# Patient Record
Sex: MALE | Race: WHITE | NOT HISPANIC OR LATINO | Employment: FULL TIME | URBAN - METROPOLITAN AREA
[De-identification: names, ages, dates, MRNs, and addresses within clinical notes are randomized per-mention and may not be internally consistent; named-entity substitution may affect disease eponyms.]

---

## 2020-01-10 ENCOUNTER — APPOINTMENT (EMERGENCY)
Dept: RADIOLOGY | Facility: HOSPITAL | Age: 40
End: 2020-01-10
Payer: COMMERCIAL

## 2020-01-10 ENCOUNTER — HOSPITAL ENCOUNTER (EMERGENCY)
Facility: HOSPITAL | Age: 40
Discharge: HOME | End: 2020-01-11
Attending: EMERGENCY MEDICINE
Payer: COMMERCIAL

## 2020-01-10 VITALS
RESPIRATION RATE: 16 BRPM | HEART RATE: 91 BPM | DIASTOLIC BLOOD PRESSURE: 72 MMHG | BODY MASS INDEX: 24.82 KG/M2 | SYSTOLIC BLOOD PRESSURE: 130 MMHG | WEIGHT: 173 LBS | OXYGEN SATURATION: 95 % | TEMPERATURE: 99 F

## 2020-01-10 DIAGNOSIS — S43.014A ANTERIOR DISLOCATION OF RIGHT SHOULDER, INITIAL ENCOUNTER: Primary | ICD-10-CM

## 2020-01-10 PROCEDURE — 96374 THER/PROPH/DIAG INJ IV PUSH: CPT

## 2020-01-10 PROCEDURE — 99284 EMERGENCY DEPT VISIT MOD MDM: CPT

## 2020-01-10 PROCEDURE — 73030 X-RAY EXAM OF SHOULDER: CPT

## 2020-01-10 PROCEDURE — 99284 EMERGENCY DEPT VISIT MOD MDM: CPT | Performed by: EMERGENCY MEDICINE

## 2020-01-10 PROCEDURE — 23655 CLTX SHO DSLC W/MNPJ W/ANES: CPT | Performed by: EMERGENCY MEDICINE

## 2020-01-10 RX ORDER — HYDROMORPHONE HCL/PF 1 MG/ML
1 SYRINGE (ML) INJECTION ONCE
Status: COMPLETED | OUTPATIENT
Start: 2020-01-10 | End: 2020-01-10

## 2020-01-10 RX ORDER — PROPOFOL 10 MG/ML
30 INJECTION, EMULSION INTRAVENOUS ONCE
Status: COMPLETED | OUTPATIENT
Start: 2020-01-10 | End: 2020-01-10

## 2020-01-10 RX ORDER — PROPOFOL 10 MG/ML
50 INJECTION, EMULSION INTRAVENOUS ONCE
Status: COMPLETED | OUTPATIENT
Start: 2020-01-10 | End: 2020-01-10

## 2020-01-10 RX ORDER — PROPOFOL 10 MG/ML
120 INJECTION, EMULSION INTRAVENOUS ONCE
Status: DISCONTINUED | OUTPATIENT
Start: 2020-01-10 | End: 2020-01-10

## 2020-01-10 RX ORDER — PROPOFOL 10 MG/ML
20 INJECTION, EMULSION INTRAVENOUS ONCE
Status: COMPLETED | OUTPATIENT
Start: 2020-01-10 | End: 2020-01-10

## 2020-01-10 RX ORDER — MIDAZOLAM HYDROCHLORIDE 2 MG/2ML
2 INJECTION, SOLUTION INTRAMUSCULAR; INTRAVENOUS ONCE
Status: COMPLETED | OUTPATIENT
Start: 2020-01-10 | End: 2020-01-10

## 2020-01-10 RX ADMIN — PROPOFOL 50 MG: 10 INJECTION, EMULSION INTRAVENOUS at 22:50

## 2020-01-10 RX ADMIN — PROPOFOL 20 MG: 10 INJECTION, EMULSION INTRAVENOUS at 22:51

## 2020-01-10 RX ADMIN — HYDROMORPHONE HYDROCHLORIDE 1 MG: 1 INJECTION, SOLUTION INTRAMUSCULAR; INTRAVENOUS; SUBCUTANEOUS at 22:01

## 2020-01-10 RX ADMIN — MIDAZOLAM 2 MG: 1 INJECTION INTRAMUSCULAR; INTRAVENOUS at 22:03

## 2020-01-10 RX ADMIN — PROPOFOL 50 MG: 10 INJECTION, EMULSION INTRAVENOUS at 22:49

## 2020-01-10 RX ADMIN — PROPOFOL 50 MG: 10 INJECTION, EMULSION INTRAVENOUS at 22:56

## 2020-01-10 RX ADMIN — PROPOFOL 30 MG: 10 INJECTION, EMULSION INTRAVENOUS at 22:54

## 2020-01-10 RX ADMIN — PROPOFOL 30 MG: 10 INJECTION, EMULSION INTRAVENOUS at 22:57

## 2020-01-10 RX ADMIN — PROPOFOL 50 MG: 10 INJECTION, EMULSION INTRAVENOUS at 22:53

## 2020-01-11 NOTE — ED PROVIDER NOTES
History  Chief Complaint   Patient presents with    Shoulder Injury     Pt reports someone ran into him while playing basketball pta  Pt comlains of right shoulder pain  Obvious deformity of right shoulder noted  Patient is a 64-year-old male presenting for right shoulder pain  Patient was playing basketball prior to arrival when he states that he jumped up and someone hit his out reached right arm  Patient believes he heard and felt a pop during this time frame and then landed on his right side  Patient presents with a right shoulder deformity  Patient states that he maintains sensation in his distal hand, he maintains sensation in axillary distribution, radial pulse 2+  Patient states he has previously had surgery to his rotator cuff and this arm however has no prosthetic joints  He denies other pain or injury  None       History reviewed  No pertinent past medical history  Past Surgical History:   Procedure Laterality Date    APPENDECTOMY      HERNIA REPAIR      ROTATOR CUFF REPAIR Right 2009    ROTATOR CUFF REPAIR Left 2012       Family History   Problem Relation Age of Onset    Heart disease Family     Diabetes Family     Heart attack Family     Cancer Family     Bleeding Disorder Family      I have reviewed and agree with the history as documented  Social History     Tobacco Use    Smoking status: Never Smoker   Substance Use Topics    Alcohol use: Yes     Comment: occasionally     Drug use: Never        Review of Systems   Musculoskeletal: Positive for arthralgias and myalgias  Negative for back pain, gait problem and neck pain  All other systems reviewed and are negative        Physical Exam  ED Triage Vitals   Temperature Pulse Respirations Blood Pressure SpO2   01/10/20 2034 01/10/20 2034 01/10/20 2034 01/10/20 2034 01/10/20 2034   99 °F (37 2 °C) (!) 108 20 148/87 97 %      Temp Source Heart Rate Source Patient Position - Orthostatic VS BP Location FiO2 (%) 01/10/20 2034 01/10/20 2034 01/10/20 2238 01/10/20 2034 --   Temporal Monitor Lying Left arm       Pain Score       01/10/20 2034       Worst Possible Pain             Orthostatic Vital Signs  Vitals:    01/10/20 2315 01/10/20 2318 01/10/20 2321 01/10/20 2321   BP: 129/70   130/72   Pulse: 79 78 82 91   Patient Position - Orthostatic VS: Lying   Lying       Physical Exam   Constitutional: He appears well-developed and well-nourished  HENT:   Head: Normocephalic and atraumatic  Nose: Nose normal    Eyes: Conjunctivae are normal  Right eye exhibits no discharge  Left eye exhibits no discharge  Cardiovascular: Normal rate, regular rhythm and normal heart sounds  Pulmonary/Chest: Effort normal and breath sounds normal  No respiratory distress  Musculoskeletal: He exhibits tenderness and deformity  He exhibits no edema  Right shoulder deformity, sensation intact distally and at axillary region, right radial pulse 2+, limited ROM at shoulder   Neurological: He is alert  No sensory deficit  He exhibits normal muscle tone  Coordination normal    Skin: Skin is warm  He is not diaphoretic  No erythema  Vitals reviewed        ED Medications  Medications   midazolam (VERSED) injection 2 mg (2 mg Intravenous Given 1/10/20 2203)   HYDROmorphone (DILAUDID) injection 1 mg (1 mg Intravenous Given 1/10/20 2201)   propofol (DIPRIVAN) 200 MG/20ML bolus injection 50 mg (50 mg Intravenous Given by Other 1/10/20 2249)   propofol (DIPRIVAN) 200 MG/20ML bolus injection 50 mg (50 mg Intravenous Given by Other 1/10/20 2250)   propofol (DIPRIVAN) 200 MG/20ML bolus injection 20 mg (20 mg Intravenous Given by Other 1/10/20 2251)   propofol (DIPRIVAN) 200 MG/20ML bolus injection 50 mg (50 mg Intravenous Given by Other 1/10/20 2253)   propofol (DIPRIVAN) 200 MG/20ML bolus injection 30 mg (30 mg Intravenous Given by Other 1/10/20 2254)   propofol (DIPRIVAN) 200 MG/20ML bolus injection 50 mg (50 mg Intravenous Given by Other 1/10/20 5336)   propofol (DIPRIVAN) 200 MG/20ML bolus injection 30 mg (30 mg Intravenous Given by Other 1/10/20 3147)       Diagnostic Studies  Results Reviewed     None                 XR shoulder 2+ vw right   ED Interpretation by Rehan Zavala MD (01/10 9213)   Primary reviewed  Satisfactory reduction  Final Result by Kathi Murray DO (01/11 6304)   FINDINGS/IMPRESSION:      There has been interval reduction in the previously seen dislocation of the right glenohumeral joint  Bone alignment is anatomic  No discrete fracture is seen  No suspicious appearing osseous lesions are identified  Workstation performed: [id number]         XR shoulder 2+ views RIGHT   ED Interpretation by Rehan Zavala MD (01/10 2208)   Primary reviewed  Anterior shoulder dislocation  Final Result by Chavo Cavanaugh MD (01/11 6380)   Anterior right shoulder dislocation with persistent Hill-Sachs deformity      No acute fracture identified      Workstation performed: [id number]               Procedures  Pre-Procedural Sedation  Performed by: Sue Fernandez DO  Authorized by: Sue Fernandez DO     Consent:     Consent obtained:  Written    Consent given by:  Patient    Risks discussed:  Prolonged sedation necessitating reversal, respiratory compromise necessitating ventilatory assistance and intubation and inadequate sedation  Universal protocol:     Procedure explained and questions answered to patient or proxy's satisfaction: yes      Relevant documents present and verified: yes      Radiology Images displayed and confirmed    If images not available, report reviewed: yes      Site/side marked: yes      Immediately prior to procedure a time out was called: yes      Patient identity confirmation method:  Verbally with patient and arm band  Indications:     Sedation purpose:  Dislocation reduction    Procedure necessitating sedation performed by:  Physician performing sedation    Intended level of sedation:  Moderate (conscious sedation)  Pre-sedation assessment:     ASA classification: class 1 - normal, healthy patient      Neck mobility: normal      Pre-sedation assessments completed and reviewed: airway patency, cardiovascular function, mental status, nausea/vomiting, pain level, respiratory function and temperature    Procedural Sedation  Date/Time: 1/10/2020 10:44 PM  Performed by: Morena Dallas DO  Authorized by: Morena Dallas DO     Immediate pre-procedure details:     Reassessment: Patient reassessed immediately prior to procedure      Reviewed: vital signs and relevant labs/tests      Verified: bag valve mask available, emergency equipment available, intubation equipment available, IV patency confirmed, oxygen available and suction available    Procedure details (see MAR for exact dosages):     Sedation start time:  1/10/2020 10:46 PM    Preoxygenation:  Nasal cannula    Sedation:  Propofol    Analgesia: dilaudid  Intra-procedure monitoring:  Blood pressure monitoring, cardiac monitor, continuous pulse oximetry, continuous capnometry, frequent vital sign checks and frequent LOC assessments    Intra-procedure events: none      Sedation end time:  1/10/2020 11:16 PM  Post-procedure details:     Post-sedation assessment completed:  1/10/2020 11:17 PM    Attendance: Constant attendance by certified staff until patient recovered      Recovery: Patient returned to pre-procedure baseline      Post-sedation assessments completed and reviewed: airway patency, cardiovascular function, mental status, nausea/vomiting, pain level, respiratory function and temperature      Patient is stable for discharge or admission: yes      Patient tolerance:   Tolerated well, no immediate complications  Orthopedic injury treatment  Date/Time: 1/10/2020 11:17 PM  Performed by: Morena Dallas DO  Authorized by: Morena Dallas DO     Patient Location:  ED  Other Assisting Provider: Yes (comment) (Dr Cherry Dougherty)    Written consent obtained?: Yes    Risks and benefits: Risks, benefits and alternatives were discussed    Consent given by:  Patient  Patient states understanding of procedure being performed: Yes    Patient's understanding of procedure matches consent: Yes    Radiology Images displayed and confirmed  If images not available, report reviewed: Yes    Required items: Required blood products, implants, devices and special equipment available    Patient identity confirmed:  Arm band and verbally with patient  Time out: Immediately prior to the procedure a time out was called    Injury location:  Shoulder  Location details:  Right shoulder  Injury type:  Dislocation  Dislocation type: anterior    Chronicity:  New  Hill-Sachs deformity?: No    Neurovascular status: Neurovascularly intact    Local anesthesia used?: No    General anesthesia used?: Yes    Sedation type: Moderate (conscious) sedation (See separate Procedural Sedation form)  Manipulation performed?: Yes    Reduction method:  Traction and counter traction and external rotation  Reduction method:  Traction and counter traction and external rotation  Reduction method:  Traction and counter traction and external rotation  Reduction method:  Traction and counter traction and external rotation  Reduction method:  Traction and counter traction and external rotation  Reduction method:  Traction and counter traction and external rotation  Reduction successful?: Yes    Confirmation: Reduction confirmed by x-ray    Immobilization: Shoulder immobilizer  Neurovascular status: Neurovascularly intact    Patient tolerance:  Patient tolerated the procedure well with no immediate complications          ED Course                               MDM  Number of Diagnoses or Management Options  Anterior dislocation of right shoulder, initial encounter:   Diagnosis management comments: Patient presents with anterior shoulder dislocation   Procedural sedation used to reduced shoulder with traction/counter-traction and external rotation  Patient was observed and returned to baseline, his wife arrived to pick him up  He was placed in shoulder immobilizer and given orthopedic follow-up information  Disposition  Final diagnoses:   Anterior dislocation of right shoulder, initial encounter     Time reflects when diagnosis was documented in both MDM as applicable and the Disposition within this note     Time User Action Codes Description Comment    1/10/2020 11:27 PM Julio Navarro Add [S43 014A] Anterior dislocation of right shoulder, initial encounter       ED Disposition     ED Disposition Condition Date/Time Comment    Discharge Stable Fri Corey 10, 2020 11:26 PM Viviana Valentin discharge to home/self care  Follow-up Information     Follow up With Specialties Details Why Contact Info    Zev Waddell MD Orthopedic Surgery Schedule an appointment as soon as possible for a visit  Schedule an appointment with your orthopedic surgeon within 1 week  [place]            There are no discharge medications for this patient  No discharge procedures on file  ED Provider  Attending physically available and evaluated Viviana Valentin  I managed the patient along with the ED Attending      Electronically Signed by         Helen Santiago DO  01/13/20 4192

## 2020-01-11 NOTE — ED ATTENDING ATTESTATION
1/10/2020  IElizabeth MD, saw and evaluated the patient  I have discussed the patient with the resident/non-physician practitioner and agree with the resident's/non-physician practitioner's findings, Plan of Care, and MDM as documented in the resident's/non-physician practitioner's note, except where noted  All available labs and Radiology studies were reviewed  I was present for key portions of any procedure(s) performed by the resident/non-physician practitioner and I was immediately available to provide assistance  At this point I agree with the current assessment done in the Emergency Department  I have conducted an independent evaluation of this patient a history and physical is as follows:    28-year-old male presents for evaluation of sudden onset of right shoulder pain  Patient was playing basketball when he jumped up and something his out reached right arm  He believes he heard a pop and fell to the floor  He presents with a deformity of the right shoulder, denies striking head, loss of conscious or traumatic injuries  Ten systems reviewed otherwise negative  On exam appears uncomfortable, right shoulder with obvious deformity, neurovascularly intact right upper extremity  Under palpation of cervical thoracic and lumbar spines, cardiac, lung, abdomen normal   Patient ambulated without difficulty  Medical decision making; obvious deformity of right shoulder-will do x-ray rule out fracture/dislocation, reduce, treat symptoms      ED Course         Critical Care Time  Procedures

## 2020-01-14 NOTE — TELEPHONE ENCOUNTER
Called and left v/m offering patient an appt to see Dr Oliver Lombard or Dr Diana Laureano regarding recent ER visit post dislocated shoulder  Left scheduling # [phone] to make appt       CB# [phone]

## 2020-01-15 ENCOUNTER — TRANSCRIBE ORDERS (OUTPATIENT)
Dept: ADMINISTRATIVE | Facility: HOSPITAL | Age: 40
End: 2020-01-15

## 2020-01-15 VITALS
WEIGHT: 182 LBS | DIASTOLIC BLOOD PRESSURE: 78 MMHG | SYSTOLIC BLOOD PRESSURE: 136 MMHG | HEIGHT: 71 IN | BODY MASS INDEX: 25.48 KG/M2 | HEART RATE: 60 BPM

## 2020-01-15 DIAGNOSIS — S43.004A SHOULDER DISLOCATION, RIGHT, INITIAL ENCOUNTER: Primary | ICD-10-CM

## 2020-01-15 DIAGNOSIS — S43.001A SUBLUXATION OF RIGHT SHOULDER JOINT, INITIAL ENCOUNTER: Primary | ICD-10-CM

## 2020-01-15 PROCEDURE — 99203 OFFICE O/P NEW LOW 30 MIN: CPT | Performed by: ORTHOPAEDIC SURGERY

## 2020-01-15 NOTE — PROGRESS NOTES
Orthopaedic Surgery - Office Note  Deanna Buerger (29 y o  male)   : 1980   MRN: [id number]  Encounter Date: 1/15/2020    Chief Complaint   Patient presents with    Right Shoulder - Pain       Assessment / Plan  Right 1st time anterior shoulder dislocation    · MRI arthrogram of right shoulder  · Maintain shoulder immobilizer  · May remove daily for elbow ROM and pendulums  · NWB RUE  · Return for F/U after MRI  History of Present Illness  Deanna Buerger is a 37 y o  male who presents for evaluation of right shoulder pain following a 1st time anterior dislocation sustained on 1/10/2020  He was playing basket ball when another player hit his arm and he had a hyperabduction mechanism  He required closed reduction in the ED  He denies numbness in the RUE  He does have some moderate shoulder discomfort  He is RHD  Review of Systems  Pertinent items are noted in HPI  All other systems were reviewed and are negative  Physical Exam  /78   Pulse 60   Ht 5' 11" (1 803 m)   Wt 82 6 kg (182 lb)   BMI 25 38 kg/m²   Cons: Appears well  No apparent distress  Psych: Alert  Oriented x3  Mood and affect normal   Eyes: PERRLA, EOMI  Resp: Normal effort  No audible wheezing or stridor  CV: Palpable pulse  No discernable arrhythmia  No LE edema  Lymph:  No palpable cervical, axillary, or inguinal lymphadenopathy  Skin: Warm  No palpable masses  No visible lesions  Neuro: Normal muscle tone  Normal and symmetric DTR's  Right Shoulder Exam  Alignment / Posture:  Normal shoulder posture  Inspection:  mild swelling  No deformity  Palpation:  diffuse tenderness  ROM:  Not tested  Strength:  Supraspinatus 4/5  Infraspinatus 4/5  Subscapularis 4/5  Stability:  Not tested  Tests: No pertinent positive or negative tests  Neurovascular:  Sensation intact in Ax/R/M/U nerve distributions  2+ radial pulse  Studies Reviewed  I have personally reviewed pertinent films in PACS    XR of right shoulder - anterior dislocation with subsequent concentric closed reduction  No fractures or degenerative changes    Procedures  No procedures today  Medical, Surgical, Family, and Social History  The patient's medical history, family history, and social history, were reviewed and updated as appropriate  History reviewed  No pertinent past medical history  Past Surgical History:   Procedure Laterality Date    APPENDECTOMY      HERNIA REPAIR      ROTATOR CUFF REPAIR Right 2009    ROTATOR CUFF REPAIR Left 2012       Family History   Problem Relation Age of Onset    Heart disease Family     Diabetes Family     Heart attack Family     Cancer Family     Bleeding Disorder Family        Social History     Occupational History    Not on file   Tobacco Use    Smoking status: Never Smoker    Smokeless tobacco: Never Used   Substance and Sexual Activity    Alcohol use: Yes     Comment: occasionally     Drug use: Never    Sexual activity: Not on file       Allergies   Allergen Reactions    Clavulanic Acid     Penicillins Hives and Rash       No current outpatient medications on file        Maurilio Moulton MD    Scribe Attestation    I,:    am acting as a scribe while in the presence of the attending physician :        I,:    personally performed the services described in this documentation    as scribed in my presence :

## 2020-01-29 ENCOUNTER — HOSPITAL ENCOUNTER (OUTPATIENT)
Dept: MRI IMAGING | Facility: HOSPITAL | Age: 40
Discharge: HOME | End: 2020-01-29
Attending: ORTHOPAEDIC SURGERY
Payer: COMMERCIAL

## 2020-01-29 ENCOUNTER — HOSPITAL ENCOUNTER (OUTPATIENT)
Dept: RADIOLOGY | Facility: HOSPITAL | Age: 40
Discharge: HOME | End: 2020-01-29
Attending: ORTHOPAEDIC SURGERY
Payer: COMMERCIAL

## 2020-01-29 DIAGNOSIS — S43.004A SHOULDER DISLOCATION, RIGHT, INITIAL ENCOUNTER: ICD-10-CM

## 2020-01-29 DIAGNOSIS — S43.001A SUBLUXATION OF RIGHT SHOULDER JOINT, INITIAL ENCOUNTER: ICD-10-CM

## 2020-01-29 PROCEDURE — 73222 MRI JOINT UPR EXTREM W/DYE: CPT

## 2020-01-29 PROCEDURE — A9585 GADOBUTROL INJECTION: HCPCS | Performed by: ORTHOPAEDIC SURGERY

## 2020-01-29 PROCEDURE — 23350 INJECTION FOR SHOULDER X-RAY: CPT

## 2020-01-29 PROCEDURE — 77002 NEEDLE LOCALIZATION BY XRAY: CPT

## 2020-01-29 RX ORDER — LIDOCAINE HYDROCHLORIDE 10 MG/ML
5 INJECTION, SOLUTION EPIDURAL; INFILTRATION; INTRACAUDAL; PERINEURAL
Status: DISCONTINUED | OUTPATIENT
Start: 2020-01-29 | End: 2020-01-30 | Stop reason: HOSPADM

## 2020-01-29 RX ORDER — SODIUM CHLORIDE 9 MG/ML
5 INJECTION INTRAVENOUS
Status: DISCONTINUED | OUTPATIENT
Start: 2020-01-29 | End: 2020-01-30 | Stop reason: HOSPADM

## 2020-01-29 RX ADMIN — IOHEXOL 50 ML: 300 INJECTION, SOLUTION INTRAVENOUS at 14:11

## 2020-01-29 RX ADMIN — GADOBUTROL 2.5 ML: 604.72 INJECTION INTRAVENOUS at 14:11

## 2020-01-31 VITALS
DIASTOLIC BLOOD PRESSURE: 82 MMHG | BODY MASS INDEX: 25.48 KG/M2 | WEIGHT: 182 LBS | SYSTOLIC BLOOD PRESSURE: 154 MMHG | HEIGHT: 71 IN | HEART RATE: 62 BPM

## 2020-01-31 DIAGNOSIS — S63.602A SPRAIN OF LEFT THUMB, UNSPECIFIED SITE OF FINGER, INITIAL ENCOUNTER: ICD-10-CM

## 2020-01-31 DIAGNOSIS — S43.004A SHOULDER DISLOCATION, RIGHT, INITIAL ENCOUNTER: Primary | ICD-10-CM

## 2020-01-31 PROCEDURE — 99213 OFFICE O/P EST LOW 20 MIN: CPT | Performed by: ORTHOPAEDIC SURGERY

## 2020-01-31 NOTE — PROGRESS NOTES
Orthopaedic Surgery - Office Note  Yenny Patrick (35 y o  male)   : 1980   MRN: [id number]  Encounter Date: 2020    Chief Complaint   Patient presents with    Right Shoulder - Follow-up       Assessment / Plan  Right anterior shoulder dislocation on 01/10/2020  Left thumb sprain    · MRI arthrogram of right shoulder confirm that there was no structural damage from dislocation  · Patient may wean out of a mobilizer at this time  · Patient was given a prescription for PT to progress strength, range of motion in the right shoulder as tolerated  · Patient may progress weight-bearing on the right upper extremity and use as tolerated  · For the patient's left thumb will give him a Comfort Cool splint to use as needed  If this along with increased use of his right arm does not resolve his pain will consider x-ray at next visit  Return in about 6 weeks (around 3/13/2020)  History of Present Illness  Yenny Patrick is a 37 y o  male follow-up for right shoulder pain following a 1st time anterior dislocation sustained on 1/10/2020  He was playing basket ball when another player hit his arm and he had a hyperabduction mechanism  He required closed reduction in the ED  He denies numbness in the RUE  He does have some moderate shoulder discomfort  He is RHD  He has been compliant with the nonweightbearing restrictions and using the shoulder immobilizer at all time  He feels that he is progressing well with his pain and discomfort  He is here for review of his MRI study of the right shoulder  He is also complaining of left thumb pain which started in 2019 when he dove for a ball playing softball and landed on the left hand  He has continued discomfort since , it initially calmed down but then 1 forced to use more of his left than the right hand due to the shoulder injury it has flared up again  He has pain over the 1st metacarpal and the proximal joint    He states he did have some swelling initially that has calmed down  He does have full movement in his thumb but he feels it is weak  Review of Systems  Pertinent items are noted in HPI  All other systems were reviewed and are negative  Physical Exam  /82   Pulse 62   Ht 5' 11" (1 803 m)   Wt 82 6 kg (182 lb)   BMI 25 38 kg/m²   Cons: Appears well  No apparent distress  Psych: Alert  Oriented x3  Mood and affect normal   Eyes: PERRLA, EOMI  Resp: Normal effort  No audible wheezing or stridor  CV: Palpable pulse  No discernable arrhythmia  No LE edema  Lymph:  No palpable cervical, axillary, or inguinal lymphadenopathy  Skin: Warm  No palpable masses  No visible lesions  Neuro: Normal muscle tone  Normal and symmetric DTR's  Right Shoulder Exam  Alignment / Posture:  Normal shoulder posture  Inspection:  mild swelling  No deformity  Palpation:  diffuse tenderness  ROM:  Not tested  Strength:  Supraspinatus 4/5  Infraspinatus 4/5  Subscapularis 4/5  Stability:  Not tested  Tests: No pertinent positive or negative tests  Neurovascular:  Sensation intact in Ax/R/M/U nerve distributions  2+ radial pulse  Right Hand & Wrist Exam  Alignment:  Normal resting hand posture  Inspection:  No swelling  Palpation:  Mild tenderness at First metacarpal carpal joint and over the metacarpal   ROM:  Normal finger ROM  Strength:  5/5  and pinch  5/5 thenar muscles  Stability:  No objective hand or wrist instability  Tests:  No pertinent positive or negative tests  Neurovascular:  Sensation intact in Ax/R/M/U nerve distributions  Sensation intact in all digital nerve distributions  Fingers warm and perfused  Studies Reviewed  I have personally reviewed pertinent films in PACS  XR of right shoulder - anterior dislocation with subsequent concentric closed reduction  No fractures or degenerative changes  MRI arthrogram of right shoulder - From 01/29/2020 show intact glenoid labrum    There is the grossly intact repair of the supraspinatus tendon  Radiology also mentions acromioplasty with small recurrent anterior subacromial spur  Procedures  No procedures today  Medical, Surgical, Family, and Social History  The patient's medical history, family history, and social history, were reviewed and updated as appropriate  History reviewed  No pertinent past medical history  Past Surgical History:   Procedure Laterality Date    APPENDECTOMY      FL INJECTION RIGHT SHOULDER (ARTHROGRAM)  1/29/2020    HERNIA REPAIR      ROTATOR CUFF REPAIR Right 2009    ROTATOR CUFF REPAIR Left 2012       Family History   Problem Relation Age of Onset    Heart disease Family     Diabetes Family     Heart attack Family     Cancer Family     Bleeding Disorder Family        Social History     Occupational History    Not on file   Tobacco Use    Smoking status: Never Smoker    Smokeless tobacco: Never Used   Substance and Sexual Activity    Alcohol use: Yes     Comment: occasionally     Drug use: Never    Sexual activity: Not on file       Allergies   Allergen Reactions    Clavulanic Acid     Penicillins Hives and Rash       No current outpatient medications on file        Michelle Juarez PA-C    Scribe Attestation    I,:    am acting as a scribe while in the presence of the attending physician :        I,:    personally performed the services described in this documentation    as scribed in my presence :

## 2020-02-12 ENCOUNTER — EVALUATION (OUTPATIENT)
Dept: PHYSICAL THERAPY | Facility: REHABILITATION | Age: 40
End: 2020-02-12
Payer: COMMERCIAL

## 2020-02-12 DIAGNOSIS — S43.004D SHOULDER DISLOCATION, RIGHT, SUBSEQUENT ENCOUNTER: Primary | ICD-10-CM

## 2020-02-12 DIAGNOSIS — S43.004A SHOULDER DISLOCATION, RIGHT, INITIAL ENCOUNTER: ICD-10-CM

## 2020-02-12 PROCEDURE — 97112 NEUROMUSCULAR REEDUCATION: CPT | Performed by: PHYSICAL THERAPIST

## 2020-02-12 PROCEDURE — 97161 PT EVAL LOW COMPLEX 20 MIN: CPT | Performed by: PHYSICAL THERAPIST

## 2020-02-17 ENCOUNTER — APPOINTMENT (OUTPATIENT)
Dept: PHYSICAL THERAPY | Facility: REHABILITATION | Age: 40
End: 2020-02-17
Payer: COMMERCIAL

## 2020-02-18 ENCOUNTER — OFFICE VISIT (OUTPATIENT)
Dept: PHYSICAL THERAPY | Facility: REHABILITATION | Age: 40
End: 2020-02-18
Payer: COMMERCIAL

## 2020-02-18 DIAGNOSIS — S43.004D SHOULDER DISLOCATION, RIGHT, SUBSEQUENT ENCOUNTER: ICD-10-CM

## 2020-02-18 DIAGNOSIS — S43.004A SHOULDER DISLOCATION, RIGHT, INITIAL ENCOUNTER: Primary | ICD-10-CM

## 2020-02-18 PROCEDURE — 97112 NEUROMUSCULAR REEDUCATION: CPT

## 2020-02-18 PROCEDURE — 97140 MANUAL THERAPY 1/> REGIONS: CPT

## 2020-02-18 NOTE — PROGRESS NOTES
Daily Note     Today's date: 2020  Patient name: Chance Cortés  : 1980  MRN: [id number]  Referring provider: Neeraj Yu PA-C  Dx:   Encounter Diagnosis     ICD-10-CM    1  Shoulder dislocation, right, initial encounter S43 004A    2  Shoulder dislocation, right, subsequent encounter S43 004D                   Subjective:  Gets some soreness yet      Objective: See treatment diary below      Assessment:   Mild soreness after ex  He is tight with IR PROM      Plan: Continue per plan of care       Precautions: anterior dislocation 20         Assessment  2-18           Eval/Reval             FOTO     **     **   HEP Issued              Manuals             PROM NT CD                                                  Exercise Diary              Wall climbs flex/scap 10x ea x           Elbow curls 5# 2x10 3x10           Shoulder flex and abd to 90 2# 2x10 x           SL ER 2# 2x10 x           TB ext   Gr 20x 5"           TB IR  Gr 20x 5"           Serratus at wall  Gr 5"x20                        Sleeper stretch NV 20'x3                                                                                                      Modalities             ice  10'             X=performed

## 2020-02-20 ENCOUNTER — APPOINTMENT (OUTPATIENT)
Dept: PHYSICAL THERAPY | Facility: REHABILITATION | Age: 40
End: 2020-02-20
Payer: COMMERCIAL

## 2020-02-20 ENCOUNTER — OFFICE VISIT (OUTPATIENT)
Dept: PHYSICAL THERAPY | Facility: REHABILITATION | Age: 40
End: 2020-02-20
Payer: COMMERCIAL

## 2020-02-20 DIAGNOSIS — S43.004D SHOULDER DISLOCATION, RIGHT, SUBSEQUENT ENCOUNTER: ICD-10-CM

## 2020-02-20 DIAGNOSIS — S43.004A SHOULDER DISLOCATION, RIGHT, INITIAL ENCOUNTER: Primary | ICD-10-CM

## 2020-02-20 PROCEDURE — 97112 NEUROMUSCULAR REEDUCATION: CPT

## 2020-02-20 PROCEDURE — 97140 MANUAL THERAPY 1/> REGIONS: CPT

## 2020-02-20 NOTE — PROGRESS NOTES
Daily Note     Today's date: 2020  Patient name: Tatum Wolfe  : 1980  MRN: [id number]  Referring provider: Wallace Herbert PA-C  Dx:   Encounter Diagnosis     ICD-10-CM    1  Shoulder dislocation, right, initial encounter S43 004A    2  Shoulder dislocation, right, subsequent encounter S43 004D                   Subjective:   Reports he is feeling pretty good   Still gets some discomfort down arm & into shoulder blade at times      Objective: See treatment diary below      Assessment:   Has a painful arc with scaption at 90 yet    No c/o pain with PROM, IR is not as tight  Did well with ex      Plan: Continue per plan of care       Precautions: anterior dislocation 20         Assessment  2-18 2-20          Eval/Reval             FOTO     **     **   HEP Issued              Manuals             PROM NT CD CD                                                 Exercise Diary              Wall climbs flex/scap 10x ea x x          Elbow curls 5# 2x10 3x10 x          Shoulder flex and abd to 90 2# 2x10 x x          SL ER 2# 2x10 x x          TB ext   Gr 20x 5" x          TB IR  Gr 20x 5" x          Serratus at wall  Gr 5"x20 x                       Sleeper stretch NV 20'x3 x                       UBE   BW   10'                       Prone  ext   2# 10x                                                 Modalities             ice  10' x            X=performed

## 2020-02-25 ENCOUNTER — OFFICE VISIT (OUTPATIENT)
Dept: PHYSICAL THERAPY | Facility: REHABILITATION | Age: 40
End: 2020-02-25
Payer: COMMERCIAL

## 2020-02-25 DIAGNOSIS — S43.004D SHOULDER DISLOCATION, RIGHT, SUBSEQUENT ENCOUNTER: ICD-10-CM

## 2020-02-25 DIAGNOSIS — S43.004A SHOULDER DISLOCATION, RIGHT, INITIAL ENCOUNTER: Primary | ICD-10-CM

## 2020-02-25 PROCEDURE — 97110 THERAPEUTIC EXERCISES: CPT | Performed by: PHYSICAL THERAPIST

## 2020-02-25 PROCEDURE — 97140 MANUAL THERAPY 1/> REGIONS: CPT | Performed by: PHYSICAL THERAPIST

## 2020-02-25 PROCEDURE — 97112 NEUROMUSCULAR REEDUCATION: CPT | Performed by: PHYSICAL THERAPIST

## 2020-02-25 NOTE — PROGRESS NOTES
Daily Note     Today's date: 2020  Patient name: Judson Mccoy  : 1980  MRN: [id number]  Referring provider: Rakan Sagastume PA-C  Dx:   Encounter Diagnosis     ICD-10-CM    1  Shoulder dislocation, right, initial encounter S43 004A    2  Shoulder dislocation, right, subsequent encounter S43 004D                   Subjective: patient reports no new complaints  Objective: See treatment diary below      Assessment: Patient tolerated treatment well  Tolerated added TB rows and added resistance for various exercises well with no c/o increased symptoms  He demonstrates good technique throughout the session with minimal cuing required  He tolerates PROM well  Will benefit from continued PT      Plan: Continue per plan of care        Precautions: anterior dislocation 20         Assessment -         Eval/Reval             FOTO     **     **   HEP Issued              Manuals             PROM NT CD CD SK                                                Exercise Diary              Wall climbs flex/scap 10x ea x x x         Elbow curls 5# 2x10 3x10 x 6# 3x10         Shoulder flex and abd to 90 2# 2x10 x x 3# 2x10         SL ER 2# 2x10 x x 3# 2x10         TB ext   Gr 20x 5" x Blue + Row 20x 5"         TB IR  Gr 20x 5" x Blue 20x 5"         Serratus at wall  Gr 5"x20 x x                      Sleeper stretch NV 20'x3 x x                      UBE   BW   10' x                      Prone  ext   2# 10x 3# 2x10                                                Modalities             ice  10' x x           X=performed

## 2020-02-27 ENCOUNTER — OFFICE VISIT (OUTPATIENT)
Dept: PHYSICAL THERAPY | Facility: REHABILITATION | Age: 40
End: 2020-02-27
Payer: COMMERCIAL

## 2020-02-27 DIAGNOSIS — S43.004A SHOULDER DISLOCATION, RIGHT, INITIAL ENCOUNTER: Primary | ICD-10-CM

## 2020-02-27 DIAGNOSIS — S43.004D SHOULDER DISLOCATION, RIGHT, SUBSEQUENT ENCOUNTER: ICD-10-CM

## 2020-02-27 PROCEDURE — 97112 NEUROMUSCULAR REEDUCATION: CPT

## 2020-02-27 PROCEDURE — 97140 MANUAL THERAPY 1/> REGIONS: CPT

## 2020-02-27 PROCEDURE — 97110 THERAPEUTIC EXERCISES: CPT

## 2020-02-27 NOTE — PROGRESS NOTES
Daily Note     Today's date: 2020  Patient name: An Rosas  : 1980  MRN: [id number]  Referring provider: Azeb Munoz PA-C  Dx:   Encounter Diagnosis     ICD-10-CM    1  Shoulder dislocation, right, initial encounter S43 004A    2  Shoulder dislocation, right, subsequent encounter S43 004D            1:1 with PTA CR 5:10- 6:05  CP 6:05-6:15     Subjective: Sore after progressing resistance last session  Objective: See treatment diary below      Assessment: Patient tolerated treatment well  Continue with focus on scap stabilization  Will benefit from continued PT  Plan: Continue per plan of care  Precautions: anterior dislocation 20         Assessment         Eval/Reval             FOTO     57     **   HEP Issued              Manuals             PROM NT CD CD SK CR  10 mins                                  Exercise Diary              Wall climbs flex/scap 10x ea x x x 10 ea  Elbow curls 5# 2x10 3x10 x 6# 3x10 6#  3x10        Shoulder flex and abd to 90 2# 2x10 x x 3# 2x10 3#  2x10        SL ER 2# 2x10 x x 3# 2x10 3#  2x10        TB ext   Gr 20x 5" x Blue + Row 20x 5" Blue  + row  5"x20 ea          TB IR  Gr 20x 5" x Blue 20x 5" Blue  5"x20        Serratus at wall  Gr 5"x20 x x GTB  5"x20                     Sleeper stretch NV 20'x3 x x 20"x3                     UBE   BW   10' x 10 mins                     Prone  ext   2# 10x 3# 2x10 3#  2x10                                               Modalities             ice  10' x x 10 mins          X=performed

## 2020-03-10 ENCOUNTER — OFFICE VISIT (OUTPATIENT)
Dept: PHYSICAL THERAPY | Facility: REHABILITATION | Age: 40
End: 2020-03-10
Payer: COMMERCIAL

## 2020-03-10 DIAGNOSIS — S43.004D SHOULDER DISLOCATION, RIGHT, SUBSEQUENT ENCOUNTER: Primary | ICD-10-CM

## 2020-03-10 PROCEDURE — 97110 THERAPEUTIC EXERCISES: CPT

## 2020-03-10 PROCEDURE — 97140 MANUAL THERAPY 1/> REGIONS: CPT

## 2020-03-10 PROCEDURE — 97112 NEUROMUSCULAR REEDUCATION: CPT

## 2020-03-10 NOTE — PROGRESS NOTES
Daily Note     Today's date: 3/10/2020  Patient name: Elizabeth Duran  : 1980  MRN: [id number]  Referring provider: Pat Ibanez PA-C  Dx:   Encounter Diagnosis     ICD-10-CM    1  Shoulder dislocation, right, subsequent encounter S43 004D                   Subjective: Patient reports his shoulder is a little sore today from throwing the baseball with his son yesterday  Objective: See treatment diary below      Assessment: Patient fatigues quickly with with body blade, reports no increased pain during or post tx  Plan: Continue per plan of care  Precautions: anterior dislocation 20         Assessment 2/12 2-18 2-20 2/25 2/27 3/10       Eval/Reval             FOTO     57     **   HEP Issued              Manuals     2/27 3/10       PROM (RIGHT) NT CD CD SK CR  10 mins HS                                 Exercise Diary      2/27 3/10       Wall climbs flex/scap 10x ea x x x 10 ea  10 ea       Elbow curls 5# 2x10 3x10 x 6# 3x10 6#  3x10 6# 3x10       Shoulder flex and abd to 90 2# 2x10 x x 3# 2x10 3#  2x10 3# 2x10       SL ER 2# 2x10 x x 3# 2x10 3#  2x10 3# 3x10       TB ext   Gr 20x 5" x Blue + Row 20x 5" Blue  + row  5"x20 ea   Blue + row 5"x20 ea       TB IR  Gr 20x 5" x Blue 20x 5" Blue  5"x20 Blue 5" x20       Serratus at wall  Gr 5"x20 x x GTB  5"x20 BTB 5" x20                    Sleeper stretch NV 20'x3 x x 20"x3 20"x4                    UBE   BW   10' x 10 mins 10'                    Prone  ext   2# 10x 3# 2x10 3#  2x10 3# 2x10       Body blade      All planes 1x to fatigue ea                                 Modalities             ice  10' x x 10 mins          X=performed

## 2020-03-17 ENCOUNTER — APPOINTMENT (OUTPATIENT)
Dept: RADIOLOGY | Facility: MEDICAL CENTER | Age: 40
End: 2020-03-17
Payer: COMMERCIAL

## 2020-03-17 ENCOUNTER — OFFICE VISIT (OUTPATIENT)
Dept: OBGYN CLINIC | Facility: MEDICAL CENTER | Age: 40
End: 2020-03-17
Payer: COMMERCIAL

## 2020-03-17 ENCOUNTER — OFFICE VISIT (OUTPATIENT)
Dept: PHYSICAL THERAPY | Facility: REHABILITATION | Age: 40
End: 2020-03-17
Payer: COMMERCIAL

## 2020-03-17 VITALS
WEIGHT: 180 LBS | HEIGHT: 70 IN | HEART RATE: 63 BPM | DIASTOLIC BLOOD PRESSURE: 77 MMHG | SYSTOLIC BLOOD PRESSURE: 150 MMHG | BODY MASS INDEX: 25.77 KG/M2

## 2020-03-17 DIAGNOSIS — S63.602A SPRAIN OF LEFT THUMB, UNSPECIFIED SITE OF FINGER, INITIAL ENCOUNTER: ICD-10-CM

## 2020-03-17 DIAGNOSIS — S43.004A SHOULDER DISLOCATION, RIGHT, INITIAL ENCOUNTER: ICD-10-CM

## 2020-03-17 DIAGNOSIS — S43.004A SHOULDER DISLOCATION, RIGHT, INITIAL ENCOUNTER: Primary | ICD-10-CM

## 2020-03-17 DIAGNOSIS — S43.004D SHOULDER DISLOCATION, RIGHT, SUBSEQUENT ENCOUNTER: Primary | ICD-10-CM

## 2020-03-17 PROCEDURE — 97112 NEUROMUSCULAR REEDUCATION: CPT | Performed by: PHYSICAL MEDICINE & REHABILITATION

## 2020-03-17 PROCEDURE — 73030 X-RAY EXAM OF SHOULDER: CPT

## 2020-03-17 PROCEDURE — 99213 OFFICE O/P EST LOW 20 MIN: CPT | Performed by: ORTHOPAEDIC SURGERY

## 2020-03-17 PROCEDURE — 97110 THERAPEUTIC EXERCISES: CPT | Performed by: PHYSICAL MEDICINE & REHABILITATION

## 2020-03-17 NOTE — PROGRESS NOTES
Daily Note     Today's date: 3/17/2020  Patient name: Inna Sebastian  : 1980  MRN: [id number]  Referring provider: Elease Bosworth, PA-C  Dx:   Encounter Diagnosis     ICD-10-CM    1  Shoulder dislocation, right, subsequent encounter S43 004D        Start Time: 730  Stop Time: 08  Total time in clinic (min): 55 minutes    Subjective: Pt states that his shoulder is feeling pretty good today  Objective: See treatment diary below      Assessment: Tolerated treatment well  Patient demonstrated fatigue post treatment, exhibited good technique with therapeutic exercises and would benefit from continued PT      Plan: Progress treatment as tolerated  Precautions: anterior dislocation 20         Assessment 2/12 2-18 2-20 2/25 2/27 3/10 3/17      Eval/Reval             FOTO     57     **   HEP Issued              Manuals     2/27 3/10       PROM (RIGHT) NT CD CD SK CR  10 mins HS --                                Exercise Diary      2/27 3/10       Wall climbs flex/scap 10x ea x x x 10 ea  10 ea       Elbow curls 5# 2x10 3x10 x 6# 3x10 6#  3x10 6# 3x10       Shoulder flex and abd to 90 2# 2x10 x x 3# 2x10 3#  2x10 3# 2x10       TB PNF D1/2 flex/ext             SL ER 2# 2x10 x x 3# 2x10 3#  2x10 3# 3x10       TB ext   Gr 20x 5" x Blue + Row 20x 5" Blue  + row  5"x20 ea   Blue + row 5"x20 ea       Prone Y T             TB row+ER+ overhead press       GTB  15      TB IR  Gr 20x 5" x Blue 20x 5" Blue  5"x20 Blue 5" x20 @90  BTB  20 x      TB ER       @90 BTB  20      Serratus at wall  Gr 5"x20 x x GTB  5"x20 BTB 5" x20 OTB  15 c 5"  Wall slide                   Sleeper stretch NV 20'x3 x x 20"x3 20"x4       BTB strap stretch       10 x 10'      UBE   BW   10' x 10 mins 10' 8'  30"/30" fwd/bkwd                   Prone  ext   2# 10x 3# 2x10 3#  2x10 3# 2x10       Body blade      All planes 1x to fatigue ea                                 Modalities             ice  10' x x 10 mins X=performed

## 2020-03-17 NOTE — PROGRESS NOTES
Orthopaedic Surgery - Office Note  Nima Chambers (71 y o  male)   : 1980   MRN: [id number]  Encounter Date: 3/17/2020    Chief Complaint   Patient presents with    Right Shoulder - Follow-up       Assessment / Plan  Status post right anterior shoulder dislocation on 01/10/2020    · Progress abduction external rotation and start throwing program with physical therapy  New order provided  Return in about 6 weeks (around 2020)  History of Present Illness  Nima Chambers is a 37 y o  male who presents to the office status post right anterior shoulder dislocation on 01/10/2020  He has been progressing in physical therapy  He occasionally experiences pain with throwing activities  He denies any instability, numbness or tingling  He does note improvement in his left thumb since last visit  He struggles to open jars due to pain  Review of Systems  Pertinent items are noted in HPI  All other systems were reviewed and are negative  Physical Exam  /77   Pulse 63   Ht 5' 10" (1 778 m)   Wt 81 6 kg (180 lb)   BMI 25 83 kg/m²   Cons: Appears well  No apparent distress  Psych: Alert  Oriented x3  Mood and affect normal   Eyes: PERRLA, EOMI  Resp: Normal effort  No audible wheezing or stridor  CV: Palpable pulse  No discernable arrhythmia  No LE edema  Lymph:  No palpable cervical, axillary, or inguinal lymphadenopathy  Skin: Warm  No palpable masses  No visible lesions  Neuro: Normal muscle tone  Normal and symmetric DTR's  Right Shoulder Exam  Alignment / Posture:  Normal shoulder posture  Inspection:  No swelling  No ecchymosis  Palpation:  No tenderness  ROM:  Shoulder  degres  Shoulder ER 40 degrees  Shoulder IR T6  Shoulder AER 90 degrees  Shoulder AIR 40 degrees  Strength:  Shoulder FE 4/5  Shoulder ER 4/5  Stability:  (-) Apprehension  Tests: No pertinent positive or negative tests  Neurovascular:  Sensation intact in Ax/R/M/U nerve distributions   Palpable radial pulse  Studies Reviewed  I have personally reviewed pertinent films in PACS  XR of right shoulder - no acute osseous abnormalities    Procedures  No procedures today  Medical, Surgical, Family, and Social History  The patient's medical history, family history, and social history, were reviewed and updated as appropriate  History reviewed  No pertinent past medical history  Past Surgical History:   Procedure Laterality Date    APPENDECTOMY      FL INJECTION RIGHT SHOULDER (ARTHROGRAM)  1/29/2020    HERNIA REPAIR      ROTATOR CUFF REPAIR Right 2009    ROTATOR CUFF REPAIR Left 2012    ROTATOR CUFF REPAIR Bilateral        Family History   Problem Relation Age of Onset    Heart disease Family     Diabetes Family     Heart attack Family     Cancer Family     Bleeding Disorder Family        Social History     Occupational History    Not on file   Tobacco Use    Smoking status: Never Smoker    Smokeless tobacco: Never Used   Substance and Sexual Activity    Alcohol use: Yes     Comment: occasionally     Drug use: Never    Sexual activity: Not on file       Allergies   Allergen Reactions    Clavulanic Acid     Penicillins Hives and Rash       No current outpatient medications on file        Rip Cheung    Scribe Attestation    I,:   Rip Cheung am acting as a scribe while in the presence of the attending physician :        I,:   Adrian Tuttle MD personally performed the services described in this documentation    as scribed in my presence :

## 2020-03-24 ENCOUNTER — OFFICE VISIT (OUTPATIENT)
Dept: PHYSICAL THERAPY | Facility: REHABILITATION | Age: 40
End: 2020-03-24
Payer: COMMERCIAL

## 2020-03-24 DIAGNOSIS — S43.004D SHOULDER DISLOCATION, RIGHT, SUBSEQUENT ENCOUNTER: Primary | ICD-10-CM

## 2020-03-24 DIAGNOSIS — S43.004A SHOULDER DISLOCATION, RIGHT, INITIAL ENCOUNTER: ICD-10-CM

## 2020-03-24 PROCEDURE — 97110 THERAPEUTIC EXERCISES: CPT | Performed by: PHYSICAL THERAPIST

## 2020-03-24 PROCEDURE — 97112 NEUROMUSCULAR REEDUCATION: CPT | Performed by: PHYSICAL THERAPIST

## 2020-03-24 NOTE — PROGRESS NOTES
Daily Note     Today's date: 3/24/2020  Patient name: Armand Neff  : 1980  MRN: [id number]  Referring provider: Mejia Oliveros PA-C  Dx:   Encounter Diagnosis     ICD-10-CM    1  Shoulder dislocation, right, subsequent encounter S43 004D    2  Shoulder dislocation, right, initial encounter S43 004A                   Subjective: Pt states that his shoulder is feeling pretty good today  He has been doing light throwing with his son in the yard, short distances with light force only  He was released to begin throwing program per Dr Carla Swift at last f/u appt  Objective: See treatment diary below      Assessment: Pt with good tolerance to progression of program reporting moderate fatigue with completion of session  He required manual and verbal cues for correct positioning with all exercises performed today  Pt issued updated HEP to which he demonstrated and verbalized understanding  Pt will benefit from continued skilled PT intervention in order to address his remaining limitations and to restore maximal function  Plan: Progress treatment as tolerated  Precautions: anterior dislocation 20         Assessment -18 2-20 2/25 2/27 3/10 3/17 3/24     Eval/Reval             FOTO     57     **   HEP Issued         Yes     Manuals     2/27 3/10  3/24     PROM (RIGHT) NT CD CD SK CR  10 mins HS -- NP                               Exercise Diary      2/27 3/10  3/24     Wall climbs flex/scap 10x ea x x x 10 ea  10 ea       Elbow curls 5# 2x10 3x10 x 6# 3x10 6#  3x10 6# 3x10       Shoulder flex and abd to 90 2# 2x10 x x 3# 2x10 3#  2x10 3# 2x10       TB PNF D1/2 flex/ext        YTB  30 ea     SL ER 2# 2x10 x x 3# 2x10 3#  2x10 3# 3x10       TB ext   Gr 20x 5" x Blue + Row 20x 5" Blue  + row  5"x20 ea   Blue + row 5"x20 ea       Prone Y T             TB row+ER+ overhead press       GTB  15      TB IR  Gr 20x 5" x Blue 20x 5" Blue  5"x20 Blue 5" x20 @90  BTB  20 x      TB ER       @90 BTB  20      Serratus at wall  Gr 5"x20 x x GTB  5"x20 BTB 5" x20 OTB  15 c 5"  Wall slide OTB  5"x10  Wall slide                  Sleeper stretch NV 20'x3 x x 20"x3 20"x4       BTB strap stretch       10 x 10' HEP     UBE   BW   10' x 10 mins 10' 8'  30"/30" fwd/bkwd L4  8 min  standing  30"/30" fwd/bkwd       Prone H  ABD        2#  2x10     Prone  ext   2# 10x 3# 2x10 3#  2x10 3# 2x10  2#  2x10     Body blade      All planes 1x to fatigue ea  IR/ER 0*  IR/ER 90*  Flex/Ext 90*  30"x3 ea     Prone Row + ER        2#  2x10                  Modalities     2/27   3/24     ice  10' x x 10 mins          X=performed

## 2020-03-31 ENCOUNTER — APPOINTMENT (OUTPATIENT)
Dept: PHYSICAL THERAPY | Facility: REHABILITATION | Age: 40
End: 2020-03-31
Payer: COMMERCIAL

## 2020-04-02 ENCOUNTER — OFFICE VISIT (OUTPATIENT)
Dept: PHYSICAL THERAPY | Facility: REHABILITATION | Age: 40
End: 2020-04-02
Payer: COMMERCIAL

## 2020-04-02 DIAGNOSIS — S43.004A SHOULDER DISLOCATION, RIGHT, INITIAL ENCOUNTER: ICD-10-CM

## 2020-04-02 DIAGNOSIS — S43.004D SHOULDER DISLOCATION, RIGHT, SUBSEQUENT ENCOUNTER: Primary | ICD-10-CM

## 2020-04-02 PROCEDURE — 97110 THERAPEUTIC EXERCISES: CPT | Performed by: PHYSICAL MEDICINE & REHABILITATION

## 2020-04-02 PROCEDURE — 97140 MANUAL THERAPY 1/> REGIONS: CPT | Performed by: PHYSICAL MEDICINE & REHABILITATION

## 2020-04-02 PROCEDURE — 97112 NEUROMUSCULAR REEDUCATION: CPT | Performed by: PHYSICAL MEDICINE & REHABILITATION

## 2020-04-07 ENCOUNTER — OFFICE VISIT (OUTPATIENT)
Dept: PHYSICAL THERAPY | Facility: REHABILITATION | Age: 40
End: 2020-04-07
Payer: COMMERCIAL

## 2020-04-07 DIAGNOSIS — S43.004A SHOULDER DISLOCATION, RIGHT, INITIAL ENCOUNTER: ICD-10-CM

## 2020-04-07 DIAGNOSIS — S43.004D SHOULDER DISLOCATION, RIGHT, SUBSEQUENT ENCOUNTER: Primary | ICD-10-CM

## 2020-04-07 PROCEDURE — 97110 THERAPEUTIC EXERCISES: CPT | Performed by: PHYSICAL MEDICINE & REHABILITATION

## 2020-04-07 PROCEDURE — 97140 MANUAL THERAPY 1/> REGIONS: CPT | Performed by: PHYSICAL MEDICINE & REHABILITATION

## 2020-04-07 PROCEDURE — 97112 NEUROMUSCULAR REEDUCATION: CPT | Performed by: PHYSICAL MEDICINE & REHABILITATION

## 2020-04-14 ENCOUNTER — OFFICE VISIT (OUTPATIENT)
Dept: PHYSICAL THERAPY | Facility: REHABILITATION | Age: 40
End: 2020-04-14
Payer: COMMERCIAL

## 2020-04-14 DIAGNOSIS — S43.004A SHOULDER DISLOCATION, RIGHT, INITIAL ENCOUNTER: ICD-10-CM

## 2020-04-14 DIAGNOSIS — S43.004D SHOULDER DISLOCATION, RIGHT, SUBSEQUENT ENCOUNTER: Primary | ICD-10-CM

## 2020-04-14 PROCEDURE — 97110 THERAPEUTIC EXERCISES: CPT | Performed by: PHYSICAL MEDICINE & REHABILITATION

## 2020-04-14 PROCEDURE — 97140 MANUAL THERAPY 1/> REGIONS: CPT | Performed by: PHYSICAL MEDICINE & REHABILITATION

## 2020-04-14 PROCEDURE — 97112 NEUROMUSCULAR REEDUCATION: CPT | Performed by: PHYSICAL MEDICINE & REHABILITATION

## 2020-04-21 ENCOUNTER — OFFICE VISIT (OUTPATIENT)
Dept: PHYSICAL THERAPY | Facility: REHABILITATION | Age: 40
End: 2020-04-21
Payer: COMMERCIAL

## 2020-04-21 DIAGNOSIS — S43.004A SHOULDER DISLOCATION, RIGHT, INITIAL ENCOUNTER: ICD-10-CM

## 2020-04-21 DIAGNOSIS — S43.004D SHOULDER DISLOCATION, RIGHT, SUBSEQUENT ENCOUNTER: Primary | ICD-10-CM

## 2020-04-21 PROCEDURE — 97110 THERAPEUTIC EXERCISES: CPT

## 2020-04-21 PROCEDURE — 97112 NEUROMUSCULAR REEDUCATION: CPT

## 2020-04-28 ENCOUNTER — OFFICE VISIT (OUTPATIENT)
Dept: OBGYN CLINIC | Facility: MEDICAL CENTER | Age: 40
End: 2020-04-28
Payer: COMMERCIAL

## 2020-04-28 VITALS
SYSTOLIC BLOOD PRESSURE: 125 MMHG | TEMPERATURE: 98.7 F | BODY MASS INDEX: 25.77 KG/M2 | HEART RATE: 62 BPM | DIASTOLIC BLOOD PRESSURE: 70 MMHG | HEIGHT: 70 IN | WEIGHT: 180 LBS

## 2020-04-28 DIAGNOSIS — S43.004A SHOULDER DISLOCATION, RIGHT, INITIAL ENCOUNTER: Primary | ICD-10-CM

## 2020-04-28 PROCEDURE — 99213 OFFICE O/P EST LOW 20 MIN: CPT | Performed by: ORTHOPAEDIC SURGERY

## 2020-10-05 ENCOUNTER — HOSPITAL ENCOUNTER (OUTPATIENT)
Dept: RADIOLOGY | Facility: HOSPITAL | Age: 40
Discharge: HOME | End: 2020-10-05
Payer: COMMERCIAL

## 2020-10-05 ENCOUNTER — TELEPHONE (OUTPATIENT)
Dept: OBGYN CLINIC | Facility: HOSPITAL | Age: 40
End: 2020-10-05

## 2020-10-05 ENCOUNTER — OFFICE VISIT (OUTPATIENT)
Dept: OBGYN CLINIC | Facility: HOSPITAL | Age: 40
End: 2020-10-05
Payer: COMMERCIAL

## 2020-10-05 VITALS
DIASTOLIC BLOOD PRESSURE: 92 MMHG | SYSTOLIC BLOOD PRESSURE: 159 MMHG | HEIGHT: 70 IN | WEIGHT: 205.2 LBS | HEART RATE: 108 BPM | BODY MASS INDEX: 29.38 KG/M2

## 2020-10-05 DIAGNOSIS — M25.512 ACUTE PAIN OF LEFT SHOULDER: Primary | ICD-10-CM

## 2020-10-05 DIAGNOSIS — S49.92XA INJURY OF LEFT SHOULDER, INITIAL ENCOUNTER: ICD-10-CM

## 2020-10-05 DIAGNOSIS — M25.512 ACUTE PAIN OF LEFT SHOULDER: ICD-10-CM

## 2020-10-05 PROCEDURE — 99213 OFFICE O/P EST LOW 20 MIN: CPT | Performed by: PHYSICIAN ASSISTANT

## 2020-10-05 PROCEDURE — 73030 X-RAY EXAM OF SHOULDER: CPT

## 2020-10-06 ENCOUNTER — PREP FOR PROCEDURE (OUTPATIENT)
Dept: OBGYN CLINIC | Facility: HOSPITAL | Age: 40
End: 2020-10-06

## 2020-10-06 DIAGNOSIS — M25.512 ACUTE PAIN OF LEFT SHOULDER: ICD-10-CM

## 2020-10-06 DIAGNOSIS — S49.92XA INJURY OF LEFT SHOULDER, INITIAL ENCOUNTER: Primary | ICD-10-CM

## 2020-10-14 ENCOUNTER — HOSPITAL ENCOUNTER (OUTPATIENT)
Dept: RADIOLOGY | Facility: HOSPITAL | Age: 40
Discharge: HOME | End: 2020-10-14
Admitting: RADIOLOGY
Payer: COMMERCIAL

## 2020-10-14 ENCOUNTER — HOSPITAL ENCOUNTER (OUTPATIENT)
Dept: MRI IMAGING | Facility: HOSPITAL | Age: 40
Discharge: HOME | End: 2020-10-14
Payer: COMMERCIAL

## 2020-10-14 DIAGNOSIS — M25.512 ACUTE PAIN OF LEFT SHOULDER: ICD-10-CM

## 2020-10-14 DIAGNOSIS — S49.92XA INJURY OF LEFT SHOULDER, INITIAL ENCOUNTER: ICD-10-CM

## 2020-10-14 PROCEDURE — 73222 MRI JOINT UPR EXTREM W/DYE: CPT

## 2020-10-14 PROCEDURE — G1004 CDSM NDSC: HCPCS

## 2020-10-14 PROCEDURE — A9585 GADOBUTROL INJECTION: HCPCS | Performed by: PHYSICIAN ASSISTANT

## 2020-10-14 PROCEDURE — 77002 NEEDLE LOCALIZATION BY XRAY: CPT

## 2020-10-14 PROCEDURE — 23350 INJECTION FOR SHOULDER X-RAY: CPT

## 2020-10-14 RX ADMIN — GADOBUTROL 2.5 ML: 604.72 INJECTION INTRAVENOUS at 15:20

## 2020-10-14 RX ADMIN — IOHEXOL 2 ML: 300 INJECTION, SOLUTION INTRAVENOUS at 15:20

## 2020-10-20 ENCOUNTER — OFFICE VISIT (OUTPATIENT)
Dept: OBGYN CLINIC | Facility: MEDICAL CENTER | Age: 40
End: 2020-10-20
Payer: COMMERCIAL

## 2020-10-20 VITALS
TEMPERATURE: 97.9 F | DIASTOLIC BLOOD PRESSURE: 79 MMHG | WEIGHT: 205 LBS | HEART RATE: 60 BPM | SYSTOLIC BLOOD PRESSURE: 130 MMHG | BODY MASS INDEX: 29.35 KG/M2 | HEIGHT: 70 IN

## 2020-10-20 DIAGNOSIS — S43.015A ANTERIOR DISLOCATION OF LEFT SHOULDER, INITIAL ENCOUNTER: Primary | ICD-10-CM

## 2020-10-20 DIAGNOSIS — S46.012A TRAUMATIC COMPLETE TEAR OF LEFT ROTATOR CUFF, INITIAL ENCOUNTER: ICD-10-CM

## 2020-10-20 PROCEDURE — 99214 OFFICE O/P EST MOD 30 MIN: CPT | Performed by: ORTHOPAEDIC SURGERY

## 2020-10-26 ENCOUNTER — EVALUATION (OUTPATIENT)
Dept: PHYSICAL THERAPY | Facility: REHABILITATION | Age: 40
End: 2020-10-26
Payer: COMMERCIAL

## 2020-10-26 DIAGNOSIS — S49.92XA INJURY OF LEFT SHOULDER, INITIAL ENCOUNTER: ICD-10-CM

## 2020-10-26 DIAGNOSIS — M25.512 ACUTE PAIN OF LEFT SHOULDER: ICD-10-CM

## 2020-10-26 PROCEDURE — 97110 THERAPEUTIC EXERCISES: CPT | Performed by: PHYSICAL MEDICINE & REHABILITATION

## 2020-10-26 PROCEDURE — 97161 PT EVAL LOW COMPLEX 20 MIN: CPT | Performed by: PHYSICAL MEDICINE & REHABILITATION

## 2020-11-03 ENCOUNTER — OFFICE VISIT (OUTPATIENT)
Dept: PHYSICAL THERAPY | Facility: REHABILITATION | Age: 40
End: 2020-11-03
Payer: COMMERCIAL

## 2020-11-03 DIAGNOSIS — M25.512 ACUTE PAIN OF LEFT SHOULDER: Primary | ICD-10-CM

## 2020-11-03 DIAGNOSIS — S49.92XA INJURY OF LEFT SHOULDER, INITIAL ENCOUNTER: ICD-10-CM

## 2020-11-03 PROCEDURE — 97112 NEUROMUSCULAR REEDUCATION: CPT | Performed by: PHYSICAL MEDICINE & REHABILITATION

## 2020-11-03 PROCEDURE — 97110 THERAPEUTIC EXERCISES: CPT | Performed by: PHYSICAL MEDICINE & REHABILITATION

## 2020-11-05 ENCOUNTER — OFFICE VISIT (OUTPATIENT)
Dept: PHYSICAL THERAPY | Facility: REHABILITATION | Age: 40
End: 2020-11-05
Payer: COMMERCIAL

## 2020-11-05 DIAGNOSIS — S49.92XA INJURY OF LEFT SHOULDER, INITIAL ENCOUNTER: ICD-10-CM

## 2020-11-05 DIAGNOSIS — M25.512 ACUTE PAIN OF LEFT SHOULDER: Primary | ICD-10-CM

## 2020-11-05 PROCEDURE — 97110 THERAPEUTIC EXERCISES: CPT | Performed by: PHYSICAL MEDICINE & REHABILITATION

## 2020-11-05 PROCEDURE — 97112 NEUROMUSCULAR REEDUCATION: CPT | Performed by: PHYSICAL MEDICINE & REHABILITATION

## 2020-11-12 ENCOUNTER — OFFICE VISIT (OUTPATIENT)
Dept: PHYSICAL THERAPY | Facility: REHABILITATION | Age: 40
End: 2020-11-12
Payer: COMMERCIAL

## 2020-11-12 DIAGNOSIS — S49.92XA INJURY OF LEFT SHOULDER, INITIAL ENCOUNTER: ICD-10-CM

## 2020-11-12 DIAGNOSIS — M25.512 ACUTE PAIN OF LEFT SHOULDER: Primary | ICD-10-CM

## 2020-11-12 PROCEDURE — 97110 THERAPEUTIC EXERCISES: CPT | Performed by: PHYSICAL MEDICINE & REHABILITATION

## 2020-11-12 PROCEDURE — 97112 NEUROMUSCULAR REEDUCATION: CPT | Performed by: PHYSICAL MEDICINE & REHABILITATION

## 2020-11-17 ENCOUNTER — OFFICE VISIT (OUTPATIENT)
Dept: PHYSICAL THERAPY | Facility: REHABILITATION | Age: 40
End: 2020-11-17
Payer: COMMERCIAL

## 2020-11-17 DIAGNOSIS — S43.004A SHOULDER DISLOCATION, RIGHT, INITIAL ENCOUNTER: ICD-10-CM

## 2020-11-17 DIAGNOSIS — S49.92XA INJURY OF LEFT SHOULDER, INITIAL ENCOUNTER: ICD-10-CM

## 2020-11-17 DIAGNOSIS — M25.512 ACUTE PAIN OF LEFT SHOULDER: Primary | ICD-10-CM

## 2020-11-17 PROCEDURE — 97112 NEUROMUSCULAR REEDUCATION: CPT | Performed by: PHYSICAL MEDICINE & REHABILITATION

## 2020-11-17 PROCEDURE — 97110 THERAPEUTIC EXERCISES: CPT | Performed by: PHYSICAL MEDICINE & REHABILITATION

## 2020-11-19 ENCOUNTER — OFFICE VISIT (OUTPATIENT)
Dept: PHYSICAL THERAPY | Facility: REHABILITATION | Age: 40
End: 2020-11-19
Payer: COMMERCIAL

## 2020-11-19 DIAGNOSIS — S43.004A SHOULDER DISLOCATION, RIGHT, INITIAL ENCOUNTER: ICD-10-CM

## 2020-11-19 DIAGNOSIS — S49.92XA INJURY OF LEFT SHOULDER, INITIAL ENCOUNTER: ICD-10-CM

## 2020-11-19 DIAGNOSIS — M25.512 ACUTE PAIN OF LEFT SHOULDER: Primary | ICD-10-CM

## 2020-11-19 PROCEDURE — 97110 THERAPEUTIC EXERCISES: CPT | Performed by: PHYSICAL MEDICINE & REHABILITATION

## 2020-11-19 PROCEDURE — 97140 MANUAL THERAPY 1/> REGIONS: CPT | Performed by: PHYSICAL MEDICINE & REHABILITATION

## 2020-11-19 PROCEDURE — 97112 NEUROMUSCULAR REEDUCATION: CPT | Performed by: PHYSICAL MEDICINE & REHABILITATION

## 2020-11-23 ENCOUNTER — OFFICE VISIT (OUTPATIENT)
Dept: PHYSICAL THERAPY | Facility: REHABILITATION | Age: 40
End: 2020-11-23
Payer: COMMERCIAL

## 2020-11-23 DIAGNOSIS — S43.004A SHOULDER DISLOCATION, RIGHT, INITIAL ENCOUNTER: ICD-10-CM

## 2020-11-23 DIAGNOSIS — M25.512 ACUTE PAIN OF LEFT SHOULDER: Primary | ICD-10-CM

## 2020-11-23 DIAGNOSIS — S49.92XA INJURY OF LEFT SHOULDER, INITIAL ENCOUNTER: ICD-10-CM

## 2020-11-23 PROCEDURE — 97112 NEUROMUSCULAR REEDUCATION: CPT | Performed by: PHYSICAL MEDICINE & REHABILITATION

## 2020-11-23 PROCEDURE — 97110 THERAPEUTIC EXERCISES: CPT | Performed by: PHYSICAL MEDICINE & REHABILITATION

## 2020-12-01 ENCOUNTER — OFFICE VISIT (OUTPATIENT)
Dept: OBGYN CLINIC | Facility: MEDICAL CENTER | Age: 40
End: 2020-12-01
Payer: COMMERCIAL

## 2020-12-01 VITALS — DIASTOLIC BLOOD PRESSURE: 81 MMHG | SYSTOLIC BLOOD PRESSURE: 143 MMHG | TEMPERATURE: 98.1 F | HEART RATE: 70 BPM

## 2020-12-01 DIAGNOSIS — S46.012D TRAUMATIC COMPLETE TEAR OF LEFT ROTATOR CUFF, SUBSEQUENT ENCOUNTER: Primary | ICD-10-CM

## 2020-12-01 PROCEDURE — 99213 OFFICE O/P EST LOW 20 MIN: CPT | Performed by: ORTHOPAEDIC SURGERY

## 2020-12-22 ENCOUNTER — OFFICE VISIT (OUTPATIENT)
Dept: PHYSICAL THERAPY | Facility: REHABILITATION | Age: 40
End: 2020-12-22
Payer: COMMERCIAL

## 2020-12-22 DIAGNOSIS — S49.92XA INJURY OF LEFT SHOULDER, INITIAL ENCOUNTER: ICD-10-CM

## 2020-12-22 DIAGNOSIS — M25.512 ACUTE PAIN OF LEFT SHOULDER: Primary | ICD-10-CM

## 2020-12-22 PROCEDURE — 97112 NEUROMUSCULAR REEDUCATION: CPT | Performed by: PHYSICAL MEDICINE & REHABILITATION

## 2020-12-22 PROCEDURE — 97110 THERAPEUTIC EXERCISES: CPT | Performed by: PHYSICAL MEDICINE & REHABILITATION

## 2020-12-24 ENCOUNTER — APPOINTMENT (OUTPATIENT)
Dept: PHYSICAL THERAPY | Facility: REHABILITATION | Age: 40
End: 2020-12-24
Payer: COMMERCIAL

## 2020-12-29 ENCOUNTER — APPOINTMENT (OUTPATIENT)
Dept: PHYSICAL THERAPY | Facility: REHABILITATION | Age: 40
End: 2020-12-29
Payer: COMMERCIAL

## 2020-12-31 ENCOUNTER — APPOINTMENT (OUTPATIENT)
Dept: PHYSICAL THERAPY | Facility: REHABILITATION | Age: 40
End: 2020-12-31
Payer: COMMERCIAL

## 2021-01-19 ENCOUNTER — OFFICE VISIT (OUTPATIENT)
Dept: OBGYN CLINIC | Facility: MEDICAL CENTER | Age: 41
End: 2021-01-19
Payer: COMMERCIAL

## 2021-01-19 VITALS
HEIGHT: 69 IN | SYSTOLIC BLOOD PRESSURE: 124 MMHG | WEIGHT: 205 LBS | HEART RATE: 67 BPM | BODY MASS INDEX: 30.36 KG/M2 | DIASTOLIC BLOOD PRESSURE: 77 MMHG

## 2021-01-19 DIAGNOSIS — S46.012D TRAUMATIC COMPLETE TEAR OF LEFT ROTATOR CUFF, SUBSEQUENT ENCOUNTER: Primary | ICD-10-CM

## 2021-01-19 PROCEDURE — 99213 OFFICE O/P EST LOW 20 MIN: CPT | Performed by: ORTHOPAEDIC SURGERY

## 2021-01-19 NOTE — PROGRESS NOTES
Orthopaedic Surgery - Office Note  Tatum Wolfe (1 y o  male)   : 1980   MRN: [id number]  Encounter Date: 2021    Chief Complaint   Patient presents with    Left Shoulder - Follow-up       Assessment / Plan  left shoulder, supraspinatus tear    · Activity as tolerated  · Home exercise program reviewed  · Continue outpatient PT  · Focus on progressing strength  · Anti-inflammatories or Tylenol prn pain  · continue ice for pain relief  · If the patient starts to experience increased pain and weakness may return to discuss surgical intervention  Return if symptoms worsen or fail to improve  History of Present Illness  Tatum Wolfe is a 41 y o  male who presents for follow-up evaluation of left shoulder  Since his last appointment formal physical therapy visits ran out and he transitioned to a home exercise program   He states he was compliant with a home exercise but he does not do it as often as he should  Over the weekend he did go to the [employer] and states he was able to hit the ball without problems  He states when he did try did hit hard he did experience some soreness but no pain  He does experience an intermittent dull soreness throughout the day  He denies any further injury or trauma to his left shoulder  He denies any distal paresthesias  Review of Systems  Pertinent items are noted in HPI  All other systems were reviewed and are negative  Physical Exam  /77   Pulse 67   Ht 5' 9" (1 753 m)   Wt 93 kg (205 lb)   BMI 30 27 kg/m²   Cons: Appears well  No apparent distress  Psych: Alert  Oriented x3  Mood and affect normal   Eyes: PERRLA, EOMI  Resp: Normal effort  No audible wheezing or stridor  CV: Palpable pulse  No discernable arrhythmia  No LE edema  Lymph:  No palpable cervical, axillary, or inguinal lymphadenopathy  Skin: Warm  No palpable masses  No visible lesions  Neuro: Normal muscle tone  Normal and symmetric DTR's       170 48   4/5 er fe ir 5/5      Left Shoulder Exam  Alignment / Posture:  Normal shoulder posture  Inspection:  No swelling  No edema  No erythema  No ecchymosis  No muscle atrophy  Palpation:  No tenderness  No effusion  No warmth  No crepitus  ROM:  Shoulder   Shoulder ER 50  Shoulder IR T6   Strength:  FE 5/5  ER 4/5  IR 5/5  Stability:  No objective shoulder instability  Tests: (+) [place]  (-) Belly press  Neurovascular:  Sensation intact in Ax/R/M/U nerve distributions  2+ radial pulse  Studies Reviewed  MRI arthrogram of left shoulder - Personally reviewed the MRI arthrogram of his left shoulder obtained on October 14, 2020 which demonstrated full-thickness tear of the supraspinatus  Procedures  No procedures today  Medical, Surgical, Family, and Social History  The patient's medical history, family history, and social history, were reviewed and updated as appropriate  History reviewed  No pertinent past medical history  Past Surgical History:   Procedure Laterality Date    APPENDECTOMY      FL INJECTION LEFT SHOULDER (ARTHROGRAM)  10/14/2020    FL INJECTION RIGHT SHOULDER (ARTHROGRAM)  1/29/2020    HERNIA REPAIR      ROTATOR CUFF REPAIR Right 2009    ROTATOR CUFF REPAIR Left 2012    ROTATOR CUFF REPAIR Bilateral        Family History   Problem Relation Age of Onset    Heart disease Family     Diabetes Family     Heart attack Family     Cancer Family     Bleeding Disorder Family        Social History     Occupational History    Not on file   Tobacco Use    Smoking status: Never Smoker    Smokeless tobacco: Never Used   Substance and Sexual Activity    Alcohol use: Yes     Comment: occasionally     Drug use: Never    Sexual activity: Not on file       Allergies   Allergen Reactions    Clavulanic Acid     Penicillins Hives and Rash       No current outpatient medications on file        Beatriz Portillo    Scribe Attestation    I,:  Yamilet Estrada am acting as a scribe while in the presence of the attending physician :       I,:  Pablo Carbajal MD personally performed the services described in this documentation    as scribed in my presence :

## 2021-10-28 ENCOUNTER — TELEPHONE (OUTPATIENT)
Dept: FAMILY MEDICINE CLINIC | Facility: CLINIC | Age: 41
End: 2021-10-28

## 2021-10-28 DIAGNOSIS — Z20.822 CLOSE EXPOSURE TO COVID-19 VIRUS: Primary | ICD-10-CM

## 2021-12-30 ENCOUNTER — TELEPHONE (OUTPATIENT)
Dept: FAMILY MEDICINE CLINIC | Facility: CLINIC | Age: 41
End: 2021-12-30

## 2021-12-30 DIAGNOSIS — Z20.822 CONTACT WITH AND (SUSPECTED) EXPOSURE TO COVID-19: Primary | ICD-10-CM

## 2021-12-30 PROCEDURE — U0005 INFEC AGEN DETEC AMPLI PROBE: HCPCS | Performed by: INTERNAL MEDICINE

## 2021-12-30 PROCEDURE — U0003 INFECTIOUS AGENT DETECTION BY NUCLEIC ACID (DNA OR RNA); SEVERE ACUTE RESPIRATORY SYNDROME CORONAVIRUS 2 (SARS-COV-2) (CORONAVIRUS DISEASE [COVID-19]), AMPLIFIED PROBE TECHNIQUE, MAKING USE OF HIGH THROUGHPUT TECHNOLOGIES AS DESCRIBED BY CMS-2020-01-R: HCPCS | Performed by: INTERNAL MEDICINE

## 2022-01-05 ENCOUNTER — TELEPHONE (OUTPATIENT)
Dept: FAMILY MEDICINE CLINIC | Facility: CLINIC | Age: 42
End: 2022-01-05

## 2022-01-07 ENCOUNTER — TELEMEDICINE (OUTPATIENT)
Dept: FAMILY MEDICINE CLINIC | Facility: CLINIC | Age: 42
End: 2022-01-07
Payer: COMMERCIAL

## 2022-01-07 DIAGNOSIS — U07.1 LAB TEST POSITIVE FOR DETECTION OF COVID-19 VIRUS: ICD-10-CM

## 2022-01-07 DIAGNOSIS — Z00.01 ENCOUNTER FOR GENERAL ADULT MEDICAL EXAMINATION WITH ABNORMAL FINDINGS: Primary | ICD-10-CM

## 2022-01-07 DIAGNOSIS — F17.210 CIGARETTE SMOKER: ICD-10-CM

## 2022-01-07 DIAGNOSIS — J20.9 ACUTE BRONCHITIS, UNSPECIFIED ORGANISM: ICD-10-CM

## 2022-01-07 PROCEDURE — 99214 OFFICE O/P EST MOD 30 MIN: CPT | Performed by: INTERNAL MEDICINE

## 2022-01-07 RX ORDER — LEVOFLOXACIN 500 MG/1
500 TABLET, FILM COATED ORAL EVERY 24 HOURS
Qty: 10 TABLET | Refills: 0 | Status: SHIPPED | OUTPATIENT
Start: 2022-01-07 | End: 2022-01-17

## 2022-01-07 RX ORDER — BENZONATATE 100 MG/1
100 CAPSULE ORAL 3 TIMES DAILY PRN
Qty: 30 CAPSULE | Refills: 0 | Status: SHIPPED | OUTPATIENT
Start: 2022-01-07 | End: 2022-03-29 | Stop reason: ALTCHOICE

## 2022-01-07 NOTE — PROGRESS NOTES
Virtual Regular Visit    Verification of patient location:    Patient is located in the following state in which I hold an active license [place]      Assessment/Plan:    Problem List Items Addressed This Visit        Respiratory    Acute bronchitis     Bed rest  Increase po fluids  Levaquin, tessalon pearls  rtc in 2 weeks         Relevant Medications    levofloxacin (LEVAQUIN) 500 mg tablet    benzonatate (TESSALON PERLES) 100 mg capsule    Other Relevant Orders    COVID Only- Collected at [facility]       Other    Cigarette smoker     Pt quit few mos ago         Relevant Orders    UA (URINE) with reflex to Scope    Occult Blood, Fecal Immunochemical    PSA Total, Diagnostic    Encounter for general adult medical examination with abnormal findings - Primary     Done in detail  rtc in 2-3 weeks w blood work         Relevant Orders    UA (URINE) with reflex to Scope    Occult Blood, Fecal Immunochemical    PSA Total, Diagnostic    Comprehensive metabolic panel    CBC and differential    Magnesium    Hemoglobin A1C    Lipid panel    TSH, 3rd generation    T4, free    Vitamin D 25 hydroxy    Vitamin B12    Lab test positive for detection of COVID-19 virus     Improving  Continue supportive mangt  pcr test on Monday         Relevant Medications    levofloxacin (LEVAQUIN) 500 mg tablet    benzonatate (TESSALON PERLES) 100 mg capsule    Other Relevant Orders    COVID Only- Collected at [facility]               Reason for visit is   Chief Complaint   Patient presents with    Virtual Regular Visit    Virtual Regular Visit        Encounter provider Daina Essex, MD    Provider located at [place] [address]  [address] [address]  [address] [address] [phone]      Recent Visits  Date Type Provider Dept   01/05/22 Telephone Justino Corley [facility]   Showing recent visits within past 7 days and meeting all other requirements  Today's Visits  Date Type Provider Dept   01/07/22 Britta Escobar MD [facility]   Showing today's visits and meeting all other requirements  Future Appointments  No visits were found meeting these conditions  Showing future appointments within next 150 days and meeting all other requirements       The patient was identified by name and date of birth  Vipul Contreras was informed that this is a telemedicine visit and that the visit is being conducted through [address] [place] [address] Road [facility] and patient was informed that this is a secure, HIPAA-compliant platform  He agrees to proceed     My office door was closed  No one else was in the room  He acknowledged consent and understanding of privacy and security of the video platform  The patient has agreed to participate and understands they can discontinue the visit at any time  Patient is aware this is a billable service  Subjective  Vipul Contreras is a 35 y o  male is here for Virtual visit as Below :        35 Y O Man is here for Virtual Visit, He was tested positive for Covid 8 days ago, and still has cold symptoms, No recent Blood work, med list Reviewed w Pt; Dayquil and Nyquil and Tylenol    No past medical history on file      Past Surgical History:   Procedure Laterality Date    APPENDECTOMY      FL INJECTION LEFT SHOULDER (ARTHROGRAM)  10/14/2020    FL INJECTION RIGHT SHOULDER (ARTHROGRAM)  1/29/2020    HERNIA REPAIR      ROTATOR CUFF REPAIR Right 2009    ROTATOR CUFF REPAIR Left 2012    ROTATOR CUFF REPAIR Bilateral        Current Outpatient Medications   Medication Sig Dispense Refill    benzonatate (TESSALON PERLES) 100 mg capsule Take 1 capsule (100 mg total) by mouth 3 (three) times a day as needed for cough With food/meals 30 capsule 0    levofloxacin (LEVAQUIN) 500 mg tablet Take 1 tablet (500 mg total) by mouth every 24 hours for 10 days With food/Lunch 10 tablet 0     No current facility-administered medications for this visit  Allergies   Allergen Reactions    Clavulanic Acid     Penicillins Hives and Rash       Review of Systems   Constitutional: Negative for chills, fatigue and fever  HENT: Positive for congestion, postnasal drip, sinus pressure and sore throat  Negative for facial swelling, trouble swallowing and voice change  Eyes: Negative for pain, discharge and visual disturbance  Respiratory: Positive for cough  Negative for shortness of breath and wheezing  Cardiovascular: Negative for chest pain, palpitations and leg swelling  Gastrointestinal: Negative for abdominal pain, blood in stool, constipation, diarrhea and nausea  Endocrine: Negative for polydipsia, polyphagia and polyuria  Genitourinary: Negative for difficulty urinating, hematuria and urgency  Musculoskeletal: Negative for arthralgias and myalgias  Skin: Negative for rash  Neurological: Negative for dizziness, tremors, weakness and headaches  Hematological: Negative for adenopathy  Does not bruise/bleed easily  Psychiatric/Behavioral: Negative for dysphoric mood, sleep disturbance and suicidal ideas  Video Exam    There were no vitals filed for this visit  Physical Exam  Constitutional:       General: He is not in acute distress  HENT:      Head: Normocephalic  Mouth/Throat:      Pharynx: No oropharyngeal exudate  Eyes:      General: No scleral icterus  Conjunctiva/sclera: Conjunctivae normal       Pupils: Pupils are equal, round, and reactive to light  Neck:      Thyroid: No thyromegaly  Cardiovascular:      Rate and Rhythm: Normal rate and regular rhythm  Heart sounds: Normal heart sounds  No murmur heard  Pulmonary:      Effort: Pulmonary effort is normal  No respiratory distress  Breath sounds: Normal breath sounds  No wheezing or rales  Abdominal:      General: Bowel sounds are normal  There is no distension  Palpations: Abdomen is soft        Tenderness: There is no abdominal tenderness  There is no guarding or rebound  Musculoskeletal:         General: No tenderness  Cervical back: Neck supple  Lymphadenopathy:      Cervical: No cervical adenopathy  Skin:     Coloration: Skin is not pale  Findings: No rash  Neurological:      Mental Status: He is alert and oriented to person, place, and time  Sensory: No sensory deficit  Motor: No weakness  I spent 25 minutes directly with the patient during this visit    VIRTUAL VISIT DISCLAIMER      Xudennis Loya verbally agrees to participate in [employer]  Pt is aware that [employer] could be limited without vital signs or the ability to perform a full hands-on physical Allison Marte understands he or the provider may request at any time to terminate the video visit and request the patient to seek care or treatment in person

## 2022-01-11 PROCEDURE — U0005 INFEC AGEN DETEC AMPLI PROBE: HCPCS | Performed by: INTERNAL MEDICINE

## 2022-01-11 PROCEDURE — U0003 INFECTIOUS AGENT DETECTION BY NUCLEIC ACID (DNA OR RNA); SEVERE ACUTE RESPIRATORY SYNDROME CORONAVIRUS 2 (SARS-COV-2) (CORONAVIRUS DISEASE [COVID-19]), AMPLIFIED PROBE TECHNIQUE, MAKING USE OF HIGH THROUGHPUT TECHNOLOGIES AS DESCRIBED BY CMS-2020-01-R: HCPCS | Performed by: INTERNAL MEDICINE

## 2022-01-31 ENCOUNTER — LAB (OUTPATIENT)
Dept: LAB | Facility: MEDICAL CENTER | Age: 42
End: 2022-01-31
Payer: COMMERCIAL

## 2022-01-31 DIAGNOSIS — Z00.01 ENCOUNTER FOR GENERAL ADULT MEDICAL EXAMINATION WITH ABNORMAL FINDINGS: ICD-10-CM

## 2022-01-31 DIAGNOSIS — F17.210 CIGARETTE SMOKER: ICD-10-CM

## 2022-01-31 LAB
25(OH)D3 SERPL-MCNC: 19 NG/ML (ref 30–100)
ALBUMIN SERPL BCP-MCNC: 4.1 G/DL (ref 3.5–5)
ALP SERPL-CCNC: 51 U/L (ref 46–116)
ALT SERPL W P-5'-P-CCNC: 31 U/L (ref 12–78)
ANION GAP SERPL CALCULATED.3IONS-SCNC: 6 MMOL/L (ref 4–13)
AST SERPL W P-5'-P-CCNC: 19 U/L (ref 5–45)
BASOPHILS # BLD AUTO: 0.01 THOUSANDS/ΜL (ref 0–0.1)
BASOPHILS NFR BLD AUTO: 0 % (ref 0–1)
BILIRUB SERPL-MCNC: 0.94 MG/DL (ref 0.2–1)
BUN SERPL-MCNC: 21 MG/DL (ref 5–25)
CALCIUM SERPL-MCNC: 9.3 MG/DL (ref 8.3–10.1)
CHLORIDE SERPL-SCNC: 108 MMOL/L (ref 100–108)
CHOLEST SERPL-MCNC: 186 MG/DL
CO2 SERPL-SCNC: 27 MMOL/L (ref 21–32)
CREAT SERPL-MCNC: 1.11 MG/DL (ref 0.6–1.3)
EOSINOPHIL # BLD AUTO: 0.08 THOUSAND/ΜL (ref 0–0.61)
EOSINOPHIL NFR BLD AUTO: 1 % (ref 0–6)
ERYTHROCYTE [DISTWIDTH] IN BLOOD BY AUTOMATED COUNT: 13.2 % (ref 11.6–15.1)
GFR SERPL CREATININE-BSD FRML MDRD: 80 ML/MIN/1.73SQ M
GLUCOSE P FAST SERPL-MCNC: 102 MG/DL (ref 65–99)
HCT VFR BLD AUTO: 48.3 % (ref 36.5–49.3)
HDLC SERPL-MCNC: 43 MG/DL
HGB BLD-MCNC: 15.3 G/DL (ref 12–17)
IMM GRANULOCYTES # BLD AUTO: 0.01 THOUSAND/UL (ref 0–0.2)
IMM GRANULOCYTES NFR BLD AUTO: 0 % (ref 0–2)
LDLC SERPL CALC-MCNC: 123 MG/DL (ref 0–100)
LYMPHOCYTES # BLD AUTO: 2.02 THOUSANDS/ΜL (ref 0.6–4.47)
LYMPHOCYTES NFR BLD AUTO: 29 % (ref 14–44)
MAGNESIUM SERPL-MCNC: 2.3 MG/DL (ref 1.6–2.6)
MCH RBC QN AUTO: 30.3 PG (ref 26.8–34.3)
MCHC RBC AUTO-ENTMCNC: 31.7 G/DL (ref 31.4–37.4)
MCV RBC AUTO: 96 FL (ref 82–98)
MONOCYTES # BLD AUTO: 0.64 THOUSAND/ΜL (ref 0.17–1.22)
MONOCYTES NFR BLD AUTO: 9 % (ref 4–12)
NEUTROPHILS # BLD AUTO: 4.2 THOUSANDS/ΜL (ref 1.85–7.62)
NEUTS SEG NFR BLD AUTO: 61 % (ref 43–75)
NONHDLC SERPL-MCNC: 143 MG/DL
NRBC BLD AUTO-RTO: 0 /100 WBCS
PLATELET # BLD AUTO: 280 THOUSANDS/UL (ref 149–390)
PMV BLD AUTO: 9.5 FL (ref 8.9–12.7)
POTASSIUM SERPL-SCNC: 4.5 MMOL/L (ref 3.5–5.3)
PROT SERPL-MCNC: 8.1 G/DL (ref 6.4–8.2)
PSA SERPL-MCNC: 0.3 NG/ML (ref 0–4)
RBC # BLD AUTO: 5.05 MILLION/UL (ref 3.88–5.62)
SODIUM SERPL-SCNC: 141 MMOL/L (ref 136–145)
T4 FREE SERPL-MCNC: 0.99 NG/DL (ref 0.76–1.46)
TRIGL SERPL-MCNC: 99 MG/DL
TSH SERPL DL<=0.05 MIU/L-ACNC: 1.39 UIU/ML (ref 0.36–3.74)
VIT B12 SERPL-MCNC: 303 PG/ML (ref 100–900)
WBC # BLD AUTO: 6.96 THOUSAND/UL (ref 4.31–10.16)

## 2022-01-31 PROCEDURE — 84439 ASSAY OF FREE THYROXINE: CPT

## 2022-01-31 PROCEDURE — 84153 ASSAY OF PSA TOTAL: CPT

## 2022-01-31 PROCEDURE — 84443 ASSAY THYROID STIM HORMONE: CPT

## 2022-01-31 PROCEDURE — 82607 VITAMIN B-12: CPT

## 2022-01-31 PROCEDURE — 82306 VITAMIN D 25 HYDROXY: CPT

## 2022-01-31 PROCEDURE — 36415 COLL VENOUS BLD VENIPUNCTURE: CPT

## 2022-01-31 PROCEDURE — 83735 ASSAY OF MAGNESIUM: CPT

## 2022-01-31 PROCEDURE — 80053 COMPREHEN METABOLIC PANEL: CPT

## 2022-01-31 PROCEDURE — 85025 COMPLETE CBC W/AUTO DIFF WBC: CPT

## 2022-01-31 PROCEDURE — 80061 LIPID PANEL: CPT

## 2022-02-03 ENCOUNTER — OFFICE VISIT (OUTPATIENT)
Dept: FAMILY MEDICINE CLINIC | Facility: CLINIC | Age: 42
End: 2022-02-03
Payer: COMMERCIAL

## 2022-02-03 VITALS
TEMPERATURE: 98.1 F | RESPIRATION RATE: 14 BRPM | HEIGHT: 69 IN | OXYGEN SATURATION: 98 % | WEIGHT: 213 LBS | BODY MASS INDEX: 31.55 KG/M2 | HEART RATE: 76 BPM | SYSTOLIC BLOOD PRESSURE: 130 MMHG | DIASTOLIC BLOOD PRESSURE: 72 MMHG

## 2022-02-03 DIAGNOSIS — E53.8 LOW VITAMIN B12 LEVEL: ICD-10-CM

## 2022-02-03 DIAGNOSIS — M19.90 ARTHRITIS: ICD-10-CM

## 2022-02-03 DIAGNOSIS — B35.1 ONYCHOMYCOSIS: ICD-10-CM

## 2022-02-03 DIAGNOSIS — M79.671 RIGHT FOOT PAIN: Primary | ICD-10-CM

## 2022-02-03 DIAGNOSIS — Z11.59 ENCOUNTER FOR HEPATITIS C SCREENING TEST FOR LOW RISK PATIENT: ICD-10-CM

## 2022-02-03 DIAGNOSIS — R79.89 LOW VITAMIN D LEVEL: ICD-10-CM

## 2022-02-03 DIAGNOSIS — Z11.4 SCREENING FOR HIV (HUMAN IMMUNODEFICIENCY VIRUS): ICD-10-CM

## 2022-02-03 PROCEDURE — 1036F TOBACCO NON-USER: CPT | Performed by: INTERNAL MEDICINE

## 2022-02-03 PROCEDURE — 3008F BODY MASS INDEX DOCD: CPT | Performed by: INTERNAL MEDICINE

## 2022-02-03 PROCEDURE — 99214 OFFICE O/P EST MOD 30 MIN: CPT | Performed by: INTERNAL MEDICINE

## 2022-02-03 RX ORDER — FLUCONAZOLE 100 MG/1
100 TABLET ORAL WEEKLY
Qty: 4 TABLET | Refills: 5 | Status: SHIPPED | OUTPATIENT
Start: 2022-02-03 | End: 2022-03-29

## 2022-02-03 RX ORDER — LANOLIN ALCOHOL/MO/W.PET/CERES
1000 CREAM (GRAM) TOPICAL DAILY
Qty: 90 TABLET | Refills: 3 | Status: SHIPPED | OUTPATIENT
Start: 2022-02-03 | End: 2022-03-29 | Stop reason: SDUPTHER

## 2022-02-03 RX ORDER — ERGOCALCIFEROL 1.25 MG/1
50000 CAPSULE ORAL WEEKLY
Qty: 13 CAPSULE | Refills: 3 | Status: SHIPPED | OUTPATIENT
Start: 2022-02-03 | End: 2022-03-29 | Stop reason: SDUPTHER

## 2022-02-03 RX ORDER — COLCHICINE 0.6 MG/1
0.6 TABLET ORAL DAILY
Qty: 30 TABLET | Refills: 1 | Status: SHIPPED | OUTPATIENT
Start: 2022-02-03 | End: 2022-03-29

## 2022-02-03 NOTE — PROGRESS NOTES
BMI Counseling: Body mass index is 31 45 kg/m²  The BMI is above normal  Exercise recommendations include exercising 3-5 times per week

## 2022-02-03 NOTE — PROGRESS NOTES
Assessment/Plan:         Diagnoses and all orders for this visit:    Right foot pain; cause ? rtc in 1 mo w ;  -     XR foot 3+ vw right; Future  -     Ambulatory Referral to Podiatry; Future  -     Uric acid; Future  -     Sedimentation rate, automated; Future  -     C-reactive protein; Future  Try ;  -     colchicine (COLCRYS) 0 6 mg tablet; Take 1 tablet (0 6 mg total) by mouth daily With food/Breakfast    Encounter for hepatitis C screening test for low risk patient  -     Hepatitis C antibody; Future    Screening for HIV (human immunodeficiency virus)  -     HIV 1/2 Antigen/Antibody (4th Generation) w Reflex [facility]; Future    Onychomycosis; rtc in 1-2 mos w ; blood work,  -     UA (URINE) with reflex to Scope; Future  Try ;  -     fluconazole (DIFLUCAN) 100 mg tablet; Take 1 tablet (100 mg total) by mouth once a week 4/5    Low vitamin D level  -     ergocalciferol (VITAMIN D2) 50,000 units; Take 1 capsule (50,000 Units total) by mouth once a week    Low vitamin B12 level  -     vitamin B-12 (VITAMIN B-12) 1,000 mcg tablet; Take 1 tablet (1,000 mcg total) by mouth daily    Arthritis  -     RF Screen w/ Reflex to Titer; Future  -     REAL Screen w/ Reflex to Titer/Pattern; Future  -     Anti-DNA antibody, double-stranded; Future  -     HLA-B27 antigen; Future  -     Sjogren's Antibodies; Future        Subjective:      Patient ID: Ty Garcia is a 35 y o  male  35 y o man is here for regular check up, recent blood work and med list reviewed w pt in detail, he has few symptoms,  The following portions of the patient's history were reviewed and updated as appropriate: allergies, current medications, past family history, past medical history, past social history, past surgical history and problem list     Review of Systems   Constitutional: Negative for chills, fatigue and fever  HENT: Negative for congestion, facial swelling, sore throat, trouble swallowing and voice change      Eyes: Negative for pain, discharge and visual disturbance  Respiratory: Negative for cough, shortness of breath and wheezing  Cardiovascular: Negative for chest pain, palpitations and leg swelling  Gastrointestinal: Negative for abdominal pain, blood in stool, constipation, diarrhea and nausea  Endocrine: Negative for polydipsia, polyphagia and polyuria  Genitourinary: Negative for difficulty urinating, hematuria and urgency  Musculoskeletal: Positive for joint swelling  Negative for arthralgias and myalgias  Skin: Negative for rash  Neurological: Negative for dizziness, tremors, weakness and headaches  Hematological: Negative for adenopathy  Does not bruise/bleed easily  Psychiatric/Behavioral: Negative for dysphoric mood, sleep disturbance and suicidal ideas  Objective:      /72 (BP Location: Left arm, Patient Position: Sitting, Cuff Size: Standard)   Pulse 76   Temp 98 1 °F (36 7 °C) (Tympanic)   Resp 14   Ht 5' 9" (1 753 m)   Wt 96 6 kg (213 lb)   SpO2 98%   BMI 31 45 kg/m²          Physical Exam  Constitutional:       General: He is not in acute distress  HENT:      Head: Normocephalic  Mouth/Throat:      Pharynx: No oropharyngeal exudate  Eyes:      General: No scleral icterus  Conjunctiva/sclera: Conjunctivae normal       Pupils: Pupils are equal, round, and reactive to light  Neck:      Thyroid: No thyromegaly  Cardiovascular:      Rate and Rhythm: Normal rate and regular rhythm  Heart sounds: Normal heart sounds  No murmur heard  Pulmonary:      Effort: Pulmonary effort is normal  No respiratory distress  Breath sounds: Normal breath sounds  No wheezing or rales  Abdominal:      General: Bowel sounds are normal  There is no distension  Palpations: Abdomen is soft  Tenderness: There is no abdominal tenderness  There is no guarding or rebound  Musculoskeletal:         General: Swelling and tenderness present  Cervical back: Neck supple  Lymphadenopathy:      Cervical: No cervical adenopathy  Skin:     Coloration: Skin is not pale  Findings: Lesion and rash present  Neurological:      Mental Status: He is alert and oriented to person, place, and time  Sensory: No sensory deficit  Motor: No weakness

## 2022-02-17 ENCOUNTER — APPOINTMENT (OUTPATIENT)
Dept: RADIOLOGY | Facility: MEDICAL CENTER | Age: 42
End: 2022-02-17
Payer: COMMERCIAL

## 2022-02-17 ENCOUNTER — APPOINTMENT (OUTPATIENT)
Dept: LAB | Facility: MEDICAL CENTER | Age: 42
End: 2022-02-17
Payer: COMMERCIAL

## 2022-02-17 DIAGNOSIS — Z11.4 SCREENING FOR HIV (HUMAN IMMUNODEFICIENCY VIRUS): ICD-10-CM

## 2022-02-17 DIAGNOSIS — M79.671 RIGHT FOOT PAIN: ICD-10-CM

## 2022-02-17 DIAGNOSIS — M19.90 ARTHRITIS: ICD-10-CM

## 2022-02-17 DIAGNOSIS — Z11.59 ENCOUNTER FOR HEPATITIS C SCREENING TEST FOR LOW RISK PATIENT: ICD-10-CM

## 2022-02-17 LAB
CRP SERPL QL: <3 MG/L
ERYTHROCYTE [SEDIMENTATION RATE] IN BLOOD: 13 MM/HOUR (ref 0–14)
HCV AB SER QL: NORMAL
URATE SERPL-MCNC: 4.1 MG/DL (ref 4.2–8)

## 2022-02-17 PROCEDURE — 86225 DNA ANTIBODY NATIVE: CPT

## 2022-02-17 PROCEDURE — 86038 ANTINUCLEAR ANTIBODIES: CPT

## 2022-02-17 PROCEDURE — 81374 HLA I TYPING 1 ANTIGEN LR: CPT

## 2022-02-17 PROCEDURE — 84550 ASSAY OF BLOOD/URIC ACID: CPT

## 2022-02-17 PROCEDURE — 86140 C-REACTIVE PROTEIN: CPT

## 2022-02-17 PROCEDURE — 36415 COLL VENOUS BLD VENIPUNCTURE: CPT

## 2022-02-17 PROCEDURE — 85652 RBC SED RATE AUTOMATED: CPT

## 2022-02-17 PROCEDURE — 86430 RHEUMATOID FACTOR TEST QUAL: CPT

## 2022-02-17 PROCEDURE — 73630 X-RAY EXAM OF FOOT: CPT

## 2022-02-17 PROCEDURE — 86803 HEPATITIS C AB TEST: CPT

## 2022-02-17 PROCEDURE — 87389 HIV-1 AG W/HIV-1&-2 AB AG IA: CPT

## 2022-02-17 PROCEDURE — 86235 NUCLEAR ANTIGEN ANTIBODY: CPT

## 2022-02-18 LAB
DSDNA AB SER-ACNC: 5 IU/ML (ref 0–9)
ENA SS-A AB SER-ACNC: <0.2 AI (ref 0–0.9)
ENA SS-B AB SER-ACNC: <0.2 AI (ref 0–0.9)
HIV 1+2 AB+HIV1 P24 AG SERPL QL IA: NORMAL
RHEUMATOID FACT SER QL LA: NEGATIVE
RYE IGE QN: NEGATIVE

## 2022-02-24 LAB — HLA-B27 QL NAA+PROBE: NEGATIVE

## 2022-03-29 ENCOUNTER — OFFICE VISIT (OUTPATIENT)
Dept: FAMILY MEDICINE CLINIC | Facility: CLINIC | Age: 42
End: 2022-03-29
Payer: COMMERCIAL

## 2022-03-29 VITALS
HEART RATE: 80 BPM | SYSTOLIC BLOOD PRESSURE: 126 MMHG | WEIGHT: 217 LBS | BODY MASS INDEX: 32.14 KG/M2 | OXYGEN SATURATION: 97 % | HEIGHT: 69 IN | DIASTOLIC BLOOD PRESSURE: 74 MMHG | TEMPERATURE: 97.3 F | RESPIRATION RATE: 16 BRPM

## 2022-03-29 DIAGNOSIS — F17.210 CIGARETTE SMOKER: ICD-10-CM

## 2022-03-29 DIAGNOSIS — R79.89 LOW VITAMIN D LEVEL: ICD-10-CM

## 2022-03-29 DIAGNOSIS — E53.8 LOW VITAMIN B12 LEVEL: Primary | ICD-10-CM

## 2022-03-29 DIAGNOSIS — Z12.11 SCREEN FOR COLON CANCER: ICD-10-CM

## 2022-03-29 DIAGNOSIS — B35.1 ONYCHOMYCOSIS: ICD-10-CM

## 2022-03-29 DIAGNOSIS — R01.1 HEART MURMUR: ICD-10-CM

## 2022-03-29 DIAGNOSIS — M19.90 ARTHRITIS: ICD-10-CM

## 2022-03-29 PROCEDURE — 1036F TOBACCO NON-USER: CPT | Performed by: INTERNAL MEDICINE

## 2022-03-29 PROCEDURE — 93000 ELECTROCARDIOGRAM COMPLETE: CPT | Performed by: INTERNAL MEDICINE

## 2022-03-29 PROCEDURE — 99214 OFFICE O/P EST MOD 30 MIN: CPT | Performed by: INTERNAL MEDICINE

## 2022-03-29 PROCEDURE — 3008F BODY MASS INDEX DOCD: CPT | Performed by: INTERNAL MEDICINE

## 2022-03-29 RX ORDER — LANOLIN ALCOHOL/MO/W.PET/CERES
1000 CREAM (GRAM) TOPICAL DAILY
Qty: 90 TABLET | Refills: 3 | Status: SHIPPED | OUTPATIENT
Start: 2022-03-29 | End: 2022-08-04 | Stop reason: SDUPTHER

## 2022-03-29 RX ORDER — ERGOCALCIFEROL 1.25 MG/1
50000 CAPSULE ORAL WEEKLY
Qty: 13 CAPSULE | Refills: 3 | Status: SHIPPED | OUTPATIENT
Start: 2022-03-29 | End: 2022-08-04 | Stop reason: SDUPTHER

## 2022-03-29 RX ORDER — TERBINAFINE HYDROCHLORIDE 250 MG/1
TABLET ORAL
COMMUNITY
Start: 2022-03-16 | End: 2022-08-04

## 2022-03-29 NOTE — PROGRESS NOTES
Assessment/Plan:         Diagnoses and all orders for this visit:    Low vitamin B12 level; Renew :  -     vitamin B-12 (VITAMIN B-12) 1,000 mcg tablet; Take 1 tablet (1,000 mcg total) by mouth daily    Low vitamin D level; Renew :  -     ergocalciferol (VITAMIN D2) 50,000 units; Take 1 capsule (50,000 Units total) by mouth once a week    Onychomycosis; continue Lamisil as Per dermatology    Arthritis; Moist Heat  Home P T  Pennsaid Lotion TID   RTC in 3 mos w Blood  work  -     H  pylori antigen, stool; Future    Cigarette smoker; Pt was advised to quit  RTC in 3 mos w  :  -     POCT ECG  -     UA (URINE) with reflex to Scope; Future  -     Comprehensive metabolic panel; Future  -     CBC and differential; Future  -     Lipid panel; Future  -     Hemoglobin A1C; Future  -     H  pylori antigen, stool; Future    Heart murmur  -     POCT ECG  -     UA (URINE) with reflex to Scope; Future  -     Comprehensive metabolic panel; Future  -     CBC and differential; Future  -     Lipid panel; Future  -     Hemoglobin A1C; Future  -     H  pylori antigen, stool; Future    Screen for colon cancer  -     Occult Blood, Fecal Immunochemical; Future    Other orders  -     terbinafine (LamISIL) 250 mg tablet; TAKE 2 TABLETS BY MOUTH EVERY DAY FOR 7 CONSECUTIVE DAYS OF EVERY MONTH FOR 3 MONTHS        Subjective:      Patient ID: Demi Preston is a 35 y o  male  35 Y O Man is here for Regular check Up, He feels [id number] [address], recent Blood work and med list reviewed w pt in detail    The following portions of the patient's history were reviewed and updated as appropriate: allergies, current medications, past family history, past medical history, past social history, past surgical history and problem list     Review of Systems   Constitutional: Negative for chills, fatigue and fever  HENT: Negative for congestion, facial swelling, sore throat, trouble swallowing and voice change      Eyes: Negative for pain, discharge and visual disturbance  Respiratory: Negative for cough, shortness of breath and wheezing  Cardiovascular: Negative for chest pain, palpitations and leg swelling  Gastrointestinal: Negative for abdominal pain, blood in stool, constipation, diarrhea and nausea  Endocrine: Negative for polydipsia, polyphagia and polyuria  Genitourinary: Negative for difficulty urinating, hematuria and urgency  Musculoskeletal: Negative for arthralgias and myalgias  Skin: Negative for rash  Neurological: Negative for dizziness, tremors, weakness and headaches  Hematological: Negative for adenopathy  Does not bruise/bleed easily  Psychiatric/Behavioral: Negative for dysphoric mood, sleep disturbance and suicidal ideas  Objective:      /74 (BP Location: Left arm, Patient Position: Sitting, Cuff Size: Adult)   Pulse 80   Temp (!) 97 3 °F (36 3 °C) (Temporal)   Resp 16   Ht 5' 9" (1 753 m)   Wt 98 4 kg (217 lb)   SpO2 97%   BMI 32 05 kg/m²          Physical Exam  Constitutional:       General: He is not in acute distress  HENT:      Head: Normocephalic  Mouth/Throat:      Pharynx: No oropharyngeal exudate  Eyes:      General: No scleral icterus  Conjunctiva/sclera: Conjunctivae normal       Pupils: Pupils are equal, round, and reactive to light  Neck:      Thyroid: No thyromegaly  Cardiovascular:      Rate and Rhythm: Normal rate and regular rhythm  Heart sounds: Murmur heard  Pulmonary:      Effort: Pulmonary effort is normal  No respiratory distress  Breath sounds: Normal breath sounds  No wheezing or rales  Abdominal:      General: Bowel sounds are normal  There is no distension  Palpations: Abdomen is soft  Tenderness: There is no abdominal tenderness  There is no guarding or rebound  Musculoskeletal:         General: No tenderness  Cervical back: Neck supple  Lymphadenopathy:      Cervical: No cervical adenopathy     Skin:     Coloration: Skin is not pale       Findings: No rash  Neurological:      Mental Status: He is alert and oriented to person, place, and time  Sensory: No sensory deficit  Motor: No weakness

## 2022-07-12 ENCOUNTER — APPOINTMENT (EMERGENCY)
Dept: RADIOLOGY | Facility: HOSPITAL | Age: 42
End: 2022-07-12
Payer: COMMERCIAL

## 2022-07-12 ENCOUNTER — HOSPITAL ENCOUNTER (EMERGENCY)
Facility: HOSPITAL | Age: 42
Discharge: HOME | End: 2022-07-12
Attending: EMERGENCY MEDICINE | Admitting: EMERGENCY MEDICINE
Payer: COMMERCIAL

## 2022-07-12 VITALS
HEART RATE: 56 BPM | TEMPERATURE: 98.1 F | DIASTOLIC BLOOD PRESSURE: 78 MMHG | BODY MASS INDEX: 31.75 KG/M2 | OXYGEN SATURATION: 94 % | WEIGHT: 215 LBS | RESPIRATION RATE: 17 BRPM | SYSTOLIC BLOOD PRESSURE: 123 MMHG

## 2022-07-12 DIAGNOSIS — R07.9 CHEST PAIN: Primary | ICD-10-CM

## 2022-07-12 LAB
2HR DELTA HS TROPONIN: -2 NG/L
ANION GAP SERPL CALCULATED.3IONS-SCNC: 7 MMOL/L (ref 4–13)
ATRIAL RATE: 56 BPM
ATRIAL RATE: 64 BPM
ATRIAL RATE: 78 BPM
BASOPHILS # BLD AUTO: 0.03 THOUSANDS/ΜL (ref 0–0.1)
BASOPHILS NFR BLD AUTO: 0 % (ref 0–1)
BUN SERPL-MCNC: 19 MG/DL (ref 5–25)
CALCIUM SERPL-MCNC: 8.7 MG/DL (ref 8.3–10.1)
CARDIAC TROPONIN I PNL SERPL HS: 11 NG/L
CARDIAC TROPONIN I PNL SERPL HS: 9 NG/L
CHLORIDE SERPL-SCNC: 102 MMOL/L (ref 100–108)
CO2 SERPL-SCNC: 32 MMOL/L (ref 21–32)
CREAT SERPL-MCNC: 1.18 MG/DL (ref 0.6–1.3)
EOSINOPHIL # BLD AUTO: 0.12 THOUSAND/ΜL (ref 0–0.61)
EOSINOPHIL NFR BLD AUTO: 1 % (ref 0–6)
ERYTHROCYTE [DISTWIDTH] IN BLOOD BY AUTOMATED COUNT: 12.9 % (ref 11.6–15.1)
GFR SERPL CREATININE-BSD FRML MDRD: 74 ML/MIN/1.73SQ M
GLUCOSE SERPL-MCNC: 75 MG/DL (ref 65–140)
HCT VFR BLD AUTO: 46.1 % (ref 36.5–49.3)
HGB BLD-MCNC: 14.9 G/DL (ref 12–17)
IMM GRANULOCYTES # BLD AUTO: 0.03 THOUSAND/UL (ref 0–0.2)
IMM GRANULOCYTES NFR BLD AUTO: 0 % (ref 0–2)
LYMPHOCYTES # BLD AUTO: 4.38 THOUSANDS/ΜL (ref 0.6–4.47)
LYMPHOCYTES NFR BLD AUTO: 44 % (ref 14–44)
MCH RBC QN AUTO: 29.9 PG (ref 26.8–34.3)
MCHC RBC AUTO-ENTMCNC: 32.3 G/DL (ref 31.4–37.4)
MCV RBC AUTO: 93 FL (ref 82–98)
MONOCYTES # BLD AUTO: 0.91 THOUSAND/ΜL (ref 0.17–1.22)
MONOCYTES NFR BLD AUTO: 9 % (ref 4–12)
NEUTROPHILS # BLD AUTO: 4.45 THOUSANDS/ΜL (ref 1.85–7.62)
NEUTS SEG NFR BLD AUTO: 46 % (ref 43–75)
NRBC BLD AUTO-RTO: 0 /100 WBCS
P AXIS: 45 DEGREES
P AXIS: 45 DEGREES
P AXIS: 50 DEGREES
PLATELET # BLD AUTO: 310 THOUSANDS/UL (ref 149–390)
PMV BLD AUTO: 9.2 FL (ref 8.9–12.7)
POTASSIUM SERPL-SCNC: 3.9 MMOL/L (ref 3.5–5.3)
PR INTERVAL: 140 MS
PR INTERVAL: 158 MS
PR INTERVAL: 162 MS
QRS AXIS: 11 DEGREES
QRS AXIS: 5 DEGREES
QRS AXIS: 7 DEGREES
QRSD INTERVAL: 100 MS
QRSD INTERVAL: 98 MS
QRSD INTERVAL: 98 MS
QT INTERVAL: 384 MS
QT INTERVAL: 410 MS
QT INTERVAL: 426 MS
QTC INTERVAL: 411 MS
QTC INTERVAL: 422 MS
QTC INTERVAL: 437 MS
RBC # BLD AUTO: 4.98 MILLION/UL (ref 3.88–5.62)
SARS-COV-2 RNA RESP QL NAA+PROBE: NEGATIVE
SODIUM SERPL-SCNC: 141 MMOL/L (ref 136–145)
T WAVE AXIS: 16 DEGREES
T WAVE AXIS: 16 DEGREES
T WAVE AXIS: 35 DEGREES
VENTRICULAR RATE: 56 BPM
VENTRICULAR RATE: 64 BPM
VENTRICULAR RATE: 78 BPM
WBC # BLD AUTO: 9.92 THOUSAND/UL (ref 4.31–10.16)

## 2022-07-12 PROCEDURE — 99285 EMERGENCY DEPT VISIT HI MDM: CPT

## 2022-07-12 PROCEDURE — U0003 INFECTIOUS AGENT DETECTION BY NUCLEIC ACID (DNA OR RNA); SEVERE ACUTE RESPIRATORY SYNDROME CORONAVIRUS 2 (SARS-COV-2) (CORONAVIRUS DISEASE [COVID-19]), AMPLIFIED PROBE TECHNIQUE, MAKING USE OF HIGH THROUGHPUT TECHNOLOGIES AS DESCRIBED BY CMS-2020-01-R: HCPCS

## 2022-07-12 PROCEDURE — 80048 BASIC METABOLIC PNL TOTAL CA: CPT

## 2022-07-12 PROCEDURE — 84484 ASSAY OF TROPONIN QUANT: CPT

## 2022-07-12 PROCEDURE — U0005 INFEC AGEN DETEC AMPLI PROBE: HCPCS

## 2022-07-12 PROCEDURE — 93005 ELECTROCARDIOGRAM TRACING: CPT

## 2022-07-12 PROCEDURE — 93010 ELECTROCARDIOGRAM REPORT: CPT | Performed by: INTERNAL MEDICINE

## 2022-07-12 PROCEDURE — 85025 COMPLETE CBC W/AUTO DIFF WBC: CPT

## 2022-07-12 PROCEDURE — 99285 EMERGENCY DEPT VISIT HI MDM: CPT | Performed by: EMERGENCY MEDICINE

## 2022-07-12 PROCEDURE — 36415 COLL VENOUS BLD VENIPUNCTURE: CPT

## 2022-07-12 PROCEDURE — 71045 X-RAY EXAM CHEST 1 VIEW: CPT

## 2022-07-12 RX ORDER — FAMOTIDINE 20 MG/1
20 TABLET, FILM COATED ORAL ONCE
Status: COMPLETED | OUTPATIENT
Start: 2022-07-12 | End: 2022-07-12

## 2022-07-12 RX ORDER — SODIUM CHLORIDE 9 MG/ML
3 INJECTION INTRAVENOUS
Status: DISCONTINUED | OUTPATIENT
Start: 2022-07-12 | End: 2022-07-12 | Stop reason: HOSPADM

## 2022-07-12 RX ORDER — MAGNESIUM HYDROXIDE/ALUMINUM HYDROXICE/SIMETHICONE 120; 1200; 1200 MG/30ML; MG/30ML; MG/30ML
30 SUSPENSION ORAL ONCE
Status: COMPLETED | OUTPATIENT
Start: 2022-07-12 | End: 2022-07-12

## 2022-07-12 RX ADMIN — FAMOTIDINE 20 MG: 20 TABLET ORAL at 03:44

## 2022-07-12 RX ADMIN — ALUMINUM HYDROXIDE, MAGNESIUM HYDROXIDE, AND SIMETHICONE 30 ML: 200; 200; 20 SUSPENSION ORAL at 03:44

## 2022-07-12 NOTE — DISCHARGE INSTRUCTIONS
We have ruled out emergent causes that would cause chest pain  I'd recommend following up with your PCP if you continue to have symptoms for further work up  If you develop new or worsening symptoms, please return to the Emergency Department for further evaluation

## 2022-07-12 NOTE — ED ATTENDING ATTESTATION
2022  Koffi DICK DO, saw and evaluated the patient  I have discussed the patient with the resident/non-physician practitioner and agree with the resident's/non-physician practitioner's findings, Plan of Care, and MDM as documented in the resident's/non-physician practitioner's note, except where noted  All available labs and Radiology studies were reviewed  I was present for key portions of any procedure(s) performed by the resident/non-physician practitioner and I was immediately available to provide assistance  At this point I agree with the current assessment done in the Emergency Department  I have conducted an independent evaluation of this patient a history and physical is as follows:    Hamzah DICK DO, saw and evaluated the patient  All available labs and X-rays were reviewed  I discussed the patient with the resident / non-physician and agree with the resident's / non-physician practitioner's findings and plan as documented in the resident's / non-physician practicitioner's note, except where noted  At this point, I agree with the current assessment done in the ED      NAME: Pati Chaudhry  AGE: 38 y o  SEX: male  : 1980   MRN: [id number]  ENCOUNTER: [id number]    DATE: 2022  TIME: 6:20 AM      History of Present Illness   Pati Chaudhry is a 38 y o  male who presents with Chest Pain (Left sided chest pain that radiates down left arm since 1115 along with nausea and chills )    has no past medical history on file        Patient presents with chest discomfort that started few hours prior to arrival   Patient was laying down, getting ready for bed when he felt pressure in the substernal chest that radiated to his left shoulder and arm  Patient denies any radiation to the neck, jaw or back  No shortness of breath associated with this tonight  Patient did take his pulse because he has a history of SVT and it was under 110       Past Medical History   History reviewed  No pertinent past medical history  Past Surgical History     Past Surgical History:   Procedure Laterality Date    APPENDECTOMY      FL INJECTION LEFT SHOULDER (ARTHROGRAM)  10/14/2020    FL INJECTION RIGHT SHOULDER (ARTHROGRAM)  1/29/2020    HERNIA REPAIR      ROTATOR CUFF REPAIR Right 2009    ROTATOR CUFF REPAIR Left 2012    ROTATOR CUFF REPAIR Bilateral        Social History     Social History     Substance and Sexual Activity   Alcohol Use Yes    Comment: occasionally      Social History     Substance and Sexual Activity   Drug Use Never     Social History     Tobacco Use   Smoking Status Never Smoker   Smokeless Tobacco Never Used       Family History     Family History   Problem Relation Age of Onset    Heart disease Family     Diabetes Family     Heart attack Family     Cancer Family     Bleeding Disorder Family        Medications Prior to Admission     Prior to Admission medications    Medication Sig Start Date End Date Taking? Authorizing Provider   ergocalciferol (VITAMIN D2) 50,000 units Take 1 capsule (50,000 Units total) by mouth once a week 3/29/22   Shine Lowe MD   terbinafine (LamISIL) 250 mg tablet TAKE 2 TABLETS BY MOUTH EVERY DAY FOR 7 CONSECUTIVE DAYS OF EVERY MONTH FOR 3 MONTHS 3/16/22   Historical Provider, MD   vitamin B-12 (VITAMIN B-12) 1,000 mcg tablet Take 1 tablet (1,000 mcg total) by mouth daily 3/29/22   Shine Lowe MD       Allergies     Allergies   Allergen Reactions    Clavulanic Acid     Penicillins Hives and Rash       Objective     Vitals:    07/12/22 0047 07/12/22 0333 07/12/22 0430 07/12/22 0530   BP: 152/88 (!) 131/38 119/77 123/78   BP Location: Right arm Left arm Left arm Left arm   Pulse: 88 63 60 56   Resp: 20 18 18 17   Temp: 98 1 °F (36 7 °C)      TempSrc: Oral      SpO2: 98% 95% 94% 94%   Weight: 97 5 kg (215 lb)        Body mass index is 31 75 kg/m²    No intake or output data in the 24 hours ending 07/12/22 0620  Invasive Devices  Report Peripheral Intravenous Line  Duration           Peripheral IV 07/12/22 Right Arm <1 day                Physical Exam  General: awake, alert, no acute distress  Head: normocephalic, atraumatic  Eyes: no scleral icterus  Ears: external ears normal, hearing grossly intact  Nose: external exam grossly normal  Neck: symmetric, No JVD noted, trachea midline  Pulmonary: no respiratory distress, no tachypnea noted  Cardiovascular: appears well perfused  Abdomen: no distention noted  Musculoskeletal: no deformities noted, tone normal  Neuro: grossly non-focal  Psych: mood and affect appropriate    Lab Results:    Labs Reviewed   NOVEL CORONAVIRUS (COVID-19), PCR [facility] - Normal       Result Value Ref Range Status    SARS-CoV-2 Negative  Negative Final    Comment:      Narrative:     FOR PEDIATRIC PATIENTS - copy/paste COVID Guidelines URL to browser: [email or web address]  [facility]    SARS-CoV-2 assay is a Nucleic Acid Amplification assay intended for the  qualitative detection of nucleic acid from SARS-CoV-2 in nasopharyngeal  swabs  Results are for the presumptive identification of SARS-CoV-2 RNA  Positive results are indicative of infection with SARS-CoV-2, the virus  causing COVID-19, but do not rule out bacterial infection or co-infection  with other viruses  Laboratories within the [place] and its  territories are required to report all positive results to the appropriate  public health authorities  Negative results do not preclude SARS-CoV-2  infection and should not be used as the sole basis for treatment or other  patient management decisions  Negative results must be combined with  clinical observations, patient history, and epidemiological information  This test has not been FDA cleared or approved  This test has been authorized by FDA under an Emergency Use Authorization  (EUA)   This test is only authorized for the duration of time the  declaration that circumstances exist justifying the authorization of the  emergency use of an in vitro diagnostic tests for detection of SARS-CoV-2  virus and/or diagnosis of COVID-19 infection under section 564(b)(1) of  the Act, 21 U  S C  105PDA-2(K)(3), unless the authorization is terminated  or revoked sooner  The test has been validated but independent review by FDA  and CLIA is pending  Test performed using [employer] GeneXpert: This RT-PCR assay targets N2,  a region unique to SARS-CoV-2  A conserved region in the E-gene was chosen  for pan-Sarbecovirus detection which includes SARS-CoV-2  HS TROPONIN I 0HR - Normal    hs TnI 0hr 11  "Refer to ACS Flowchart"- see link ng/L Final    Comment:                                              Initial (time 0) result  If >=50 ng/L, Myocardial injury suggested ;  Type of myocardial injury and treatment strategy  to be determined  If 5-49 ng/L, a delta result at 2 hours and or 4 hours will be needed to further evaluate  If <4 ng/L, and chest pain has been >3 hours since onset, patient may qualify for discharge based on the HEART score in the ED  If <5 ng/L and <3hours since onset of chest pain, a delta result at 2 hours will be needed to further evaluate  HS Troponin 99th Percentile URL of a Health Population=12 ng/L with a 95% Confidence Interval of 8-18 ng/L  Second Troponin (time 2 hours)  If calculated delta >= 20 ng/L,  Myocardial injury suggested ; Type of myocardial injury and treatment strategy to be determined  If 5-49 ng/L and the calculated delta is 5-19 ng/L, consult medical service for evaluation  Continue evaluation for ischemia on ecg and other possible etiology and repeat hs troponin at 4 hours  If delta is <5 ng/L at 2 hours, consider discharge based on risk stratification via the HEART score (if in ED), or MAYCOL risk score in IP/Observation  HS Troponin 99th Percentile URL of a Health Population=12 ng/L with a 95% Confidence Interval of 8-18 ng/L     HS TROPONIN I 2HR - Normal    hs TnI 2hr 9  "Refer to ACS Flowchart"- see link ng/L Final    Comment:                                              Initial (time 0) result  If >=50 ng/L, Myocardial injury suggested ;  Type of myocardial injury and treatment strategy  to be determined  If 5-49 ng/L, a delta result at 2 hours and or 4 hours will be needed to further evaluate  If <4 ng/L, and chest pain has been >3 hours since onset, patient may qualify for discharge based on the HEART score in the ED  If <5 ng/L and <3hours since onset of chest pain, a delta result at 2 hours will be needed to further evaluate  HS Troponin 99th Percentile URL of a Health Population=12 ng/L with a 95% Confidence Interval of 8-18 ng/L  Second Troponin (time 2 hours)  If calculated delta >= 20 ng/L,  Myocardial injury suggested ; Type of myocardial injury and treatment strategy to be determined  If 5-49 ng/L and the calculated delta is 5-19 ng/L, consult medical service for evaluation  Continue evaluation for ischemia on ecg and other possible etiology and repeat hs troponin at 4 hours  If delta is <5 ng/L at 2 hours, consider discharge based on risk stratification via the HEART score (if in ED), or MAYCOL risk score in IP/Observation  HS Troponin 99th Percentile URL of a Health Population=12 ng/L with a 95% Confidence Interval of 8-18 ng/L      Delta 2hr hsTnI -2  <20 ng/L Final   CBC AND DIFFERENTIAL    WBC 9 92  4 31 - 10 16 Thousand/uL Final    RBC 4 98  3 88 - 5 62 Million/uL Final    Hemoglobin 14 9  12 0 - 17 0 g/dL Final    Hematocrit 46 1  36 5 - 49 3 % Final    MCV 93  82 - 98 fL Final    MCH 29 9  26 8 - 34 3 pg Final    MCHC 32 3  31 4 - 37 4 g/dL Final    RDW 12 9  11 6 - 15 1 % Final    MPV 9 2  8 9 - 12 7 fL Final    Platelets 528  287 - 390 Thousands/uL Final    nRBC 0  /100 WBCs Final    Neutrophils Relative 46  43 - 75 % Final    Immat GRANS % 0  0 - 2 % Final    Lymphocytes Relative 44  14 - 44 % Final Monocytes Relative 9  4 - 12 % Final    Eosinophils Relative 1  0 - 6 % Final    Basophils Relative 0  0 - 1 % Final    Neutrophils Absolute 4 45  1 85 - 7 62 Thousands/µL Final    Immature Grans Absolute 0 03  0 00 - 0 20 Thousand/uL Final    Lymphocytes Absolute 4 38  0 60 - 4 47 Thousands/µL Final    Monocytes Absolute 0 91  0 17 - 1 22 Thousand/µL Final    Eosinophils Absolute 0 12  0 00 - 0 61 Thousand/µL Final    Basophils Absolute 0 03  0 00 - 0 10 Thousands/µL Final   BASIC METABOLIC PANEL    Sodium 615  136 - 145 mmol/L Final    Potassium 3 9  3 5 - 5 3 mmol/L Final    Comment: Slightly Hemolyzed; Results May be Affected    Chloride 102  100 - 108 mmol/L Final    CO2 32  21 - 32 mmol/L Final    ANION GAP 7  4 - 13 mmol/L Final    BUN 19  5 - 25 mg/dL Final    Creatinine 1 18  0 60 - 1 30 mg/dL Final    Comment: Standardized to IDMS reference method    Glucose 75  65 - 140 mg/dL Final    Comment: If the patient is fasting, the ADA then defines impaired fasting glucose as > 100 mg/dL and diabetes as > or equal to 123 mg/dL  Specimen collection should occur prior to Sulfasalazine administration due to the potential for falsely depressed results  Specimen collection should occur prior to Sulfapyridine administration due to the potential for falsely elevated results      Calcium 8 7  8 3 - 10 1 mg/dL Final    eGFR 74  ml/min/1 73sq m Final    Narrative:     [place] guidelines for Chronic Kidney Disease (CKD):     Stage 1 with normal or high GFR (GFR > 90 mL/min/1 73 square meters)    Stage 2 Mild CKD (GFR = 60-89 mL/min/1 73 square meters)    Stage 3A Moderate CKD (GFR = 45-59 mL/min/1 73 square meters)    Stage 3B Moderate CKD (GFR = 30-44 mL/min/1 73 square meters)    Stage 4 Severe CKD (GFR = 15-29 mL/min/1 73 square meters)    Stage 5 End Stage CKD (GFR <15 mL/min/1 73 square meters)  Note: GFR calculation is accurate only with a steady state creatinine   HS TROPONIN I 4HR Imaging:   X-ray chest 1 view portable   ED Interpretation   Poor inspiratory effort limits interpretation, no acute cardiopulmonary disease            Medications given in Emergency Department     Medication Administration - last 24 hours from 07/11/2022 0620 to 07/12/2022 1453       Date/Time Order Dose Route Action Action by     07/12/2022 0344 aluminum-magnesium hydroxide-simethicone (MYLANTA) oral suspension 30 mL 30 mL Oral Given Tora Hodgkin, RN     07/12/2022 0344 famotidine (PEPCID) tablet 20 mg 20 mg Oral Given Jose R Andrea RN          Assessment and Plan  Chest pain    Plan is for cardiac workup, treat for possible GI causes with Mylanta and Pepcid and re-evaluate  Workup is overall negative but troponin is 11  Will need a delta troponin  Patient does have a low heart score  Delta troponin is (-2)  Heart score is a 1  Will discharge home  Patient had a negative nuclear stress test about 2 years ago  I do not feel that this needs to be repeated at this time but will have him follow-up with his PCP  Discussed with him and his significant other about possible other causes such as GI causes, musculoskeletal, etcetera  Advised supportive care with over-the-counter medications as needed, look for identifying and causative factors at home, return ER precautions  They understand agree with this plan of care  Questions and concerns answered  Active Problems:    * No active hospital problems  *      Final Diagnosis:  1   Chest pain        ED Course         Critical Care Time  Procedures

## 2022-07-12 NOTE — ED PROVIDER NOTES
History  Chief Complaint   Patient presents with    Chest Pain     Left sided chest pain that radiates down left arm since 1115 along with nausea and chills  Patient is a 37 y/o M with no sig PMH presenting with left sided chest pain  Patient states left sided chest pain radiates down left arm  Started around 11pm while laying in bed  Pain described as squeezing sensation, pt kept saying "I don't feel right " Associated with strange taste in mouth that is difficult to describe, states it is neither sour/acidic nor metallic  He had some nausea at this time as well  Denies fevers, chills, SOB, vomiting, diarrhea, leg swelling  Patient expressed concerns over family history, had an uncle who  at age 39 from MI  Pt states about 2 years ago he had cardiac stress test that was normal            Prior to Admission Medications   Prescriptions Last Dose Informant Patient Reported? Taking?   ergocalciferol (VITAMIN D2) 50,000 units   No Yes   Sig: Take 1 capsule (50,000 Units total) by mouth once a week   terbinafine (LamISIL) 250 mg tablet   Yes No   Sig: TAKE 2 TABLETS BY MOUTH EVERY DAY FOR 7 CONSECUTIVE DAYS OF EVERY MONTH FOR 3 MONTHS   vitamin B-12 (VITAMIN B-12) 1,000 mcg tablet   No Yes   Sig: Take 1 tablet (1,000 mcg total) by mouth daily      Facility-Administered Medications: None       History reviewed  No pertinent past medical history  Past Surgical History:   Procedure Laterality Date    APPENDECTOMY      [place] INJECTION LEFT SHOULDER (ARTHROGRAM)  10/14/2020    [place] INJECTION RIGHT SHOULDER (ARTHROGRAM)  2020    HERNIA REPAIR      ROTATOR CUFF REPAIR Right 2009    ROTATOR CUFF REPAIR Left     ROTATOR CUFF REPAIR Bilateral        Family History   Problem Relation Age of Onset    Heart disease Family     Diabetes Family     Heart attack Family     Cancer Family     Bleeding Disorder Family      I have reviewed and agree with the history as documented      E-Cigarette/Vaping    E-Cigarette Use Never User      E-Cigarette/Vaping Substances    Nicotine No     THC No     CBD No     Flavoring No     Other No     Unknown No      Social History     Tobacco Use    Smoking status: Never Smoker    Smokeless tobacco: Never Used   Vaping Use    Vaping Use: Never used   Substance Use Topics    Alcohol use: Yes     Comment: occasionally     Drug use: Never        Review of Systems   Constitutional: Negative for chills and fever  HENT: Negative for ear pain and sore throat  Eyes: Negative for pain and visual disturbance  Respiratory: Negative for cough and shortness of breath  Cardiovascular: Positive for chest pain  Negative for palpitations  Gastrointestinal: Negative for abdominal pain and vomiting  Genitourinary: Negative for dysuria and hematuria  Musculoskeletal: Negative for arthralgias and back pain  Skin: Negative for color change and rash  Neurological: Negative for seizures and syncope  All other systems reviewed and are negative  Physical Exam  ED Triage Vitals [07/12/22 0047]   Temperature Pulse Respirations Blood Pressure SpO2   98 1 °F (36 7 °C) 88 20 152/88 98 %      Temp Source Heart Rate Source Patient Position - Orthostatic VS BP Location FiO2 (%)   Oral Monitor Sitting Right arm --      Pain Score       6             Orthostatic Vital Signs  Vitals:    07/12/22 0047 07/12/22 0333 07/12/22 0430 07/12/22 0530   BP: 152/88 (!) 131/38 119/77 123/78   Pulse: 88 63 60 56   Patient Position - Orthostatic VS: Sitting Sitting Sitting Sitting       Physical Exam  Vitals and nursing note reviewed  Constitutional:       General: He is not in acute distress  Appearance: He is well-developed  He is not toxic-appearing  HENT:      Head: Normocephalic and atraumatic  Right Ear: External ear normal       Left Ear: External ear normal       Nose: Nose normal       Mouth/Throat:      Pharynx: Oropharynx is clear   No oropharyngeal exudate or posterior oropharyngeal erythema  Eyes:      Extraocular Movements: Extraocular movements intact  Conjunctiva/sclera: Conjunctivae normal       Pupils: Pupils are equal, round, and reactive to light  Cardiovascular:      Rate and Rhythm: Normal rate and regular rhythm  Pulses: Normal pulses  Heart sounds: Normal heart sounds  Heart sounds not distant  No murmur heard  No friction rub  No gallop  Pulmonary:      Effort: Pulmonary effort is normal  No respiratory distress  Breath sounds: Normal breath sounds  No decreased breath sounds, wheezing, rhonchi or rales  Chest:      Chest wall: No tenderness  Abdominal:      General: Abdomen is flat  Palpations: Abdomen is soft  Tenderness: There is no abdominal tenderness  There is no guarding or rebound  Musculoskeletal:         General: Normal range of motion  Cervical back: Normal range of motion  No rigidity  Right lower leg: No edema  Left lower leg: No edema  Skin:     General: Skin is warm and dry  Capillary Refill: Capillary refill takes less than 2 seconds  Neurological:      General: No focal deficit present  Mental Status: He is alert  Psychiatric:         Mood and Affect: Mood normal          ED Medications  Medications   sodium chloride (PF) 0 9 % injection 3 mL (has no administration in time range)   aluminum-magnesium hydroxide-simethicone (MYLANTA) oral suspension 30 mL (30 mL Oral Given 7/12/22 0344)   famotidine (PEPCID) tablet 20 mg (20 mg Oral Given 7/12/22 0344)       Diagnostic Studies  Results Reviewed     Procedure Component Value Units Date/Time    COVID only [[id number]]  (Normal) Collected: 07/12/22 0425    Lab Status: Final result Specimen: Nares from Nose Updated: 07/12/22 0617     SARS-CoV-2 Negative    Narrative:      FOR PEDIATRIC PATIENTS - copy/paste COVID Guidelines URL to browser: [email or web address]    SARS-CoV-2 assay is a Nucleic Acid Amplification assay intended for the  qualitative detection of nucleic acid from SARS-CoV-2 in nasopharyngeal  swabs  Results are for the presumptive identification of SARS-CoV-2 RNA  Positive results are indicative of infection with SARS-CoV-2, the virus  causing COVID-19, but do not rule out bacterial infection or co-infection  with other viruses  Laboratories within the [place] and its  territories are required to report all positive results to the appropriate  public health authorities  Negative results do not preclude SARS-CoV-2  infection and should not be used as the sole basis for treatment or other  patient management decisions  Negative results must be combined with  clinical observations, patient history, and epidemiological information  This test has not been FDA cleared or approved  This test has been authorized by FDA under an Emergency Use Authorization  (EUA)  This test is only authorized for the duration of time the  declaration that circumstances exist justifying the authorization of the  emergency use of an in vitro diagnostic tests for detection of SARS-CoV-2  virus and/or diagnosis of COVID-19 infection under section 564(b)(1) of  the Act, 21 U  S C  446QJO-5(E)(4), unless the authorization is terminated  or revoked sooner  The test has been validated but independent review by FDA  and CLIA is pending  Test performed using [employer] GeneXpert: This RT-PCR assay targets N2,  a region unique to SARS-CoV-2  A conserved region in the E-gene was chosen  for pan-Sarbecovirus detection which includes SARS-CoV-2      HS Troponin I 2hr [[id number]]  (Normal) Collected: 07/12/22 0542    Lab Status: Final result Specimen: Blood from Arm, Right Updated: 07/12/22 0617     hs TnI 2hr 9 ng/L      Delta 2hr hsTnI -2 ng/L     HS Troponin I 4hr [[id number]]     Lab Status: No result Specimen: Blood     HS Troponin 0hr (reflex protocol) [[id number]]  (Normal) Collected: 07/12/22 9065 Lab Status: Final result Specimen: Blood from Arm, Right Updated: 07/12/22 0418     hs TnI 0hr 11 ng/L     Basic metabolic panel [[id number]] Collected: 07/12/22 0343    Lab Status: Final result Specimen: Blood from Arm, Right Updated: 07/12/22 0410     Sodium 141 mmol/L      Potassium 3 9 mmol/L      Chloride 102 mmol/L      CO2 32 mmol/L      ANION GAP 7 mmol/L      BUN 19 mg/dL      Creatinine 1 18 mg/dL      Glucose 75 mg/dL      Calcium 8 7 mg/dL      eGFR 74 ml/min/1 73sq m     Narrative:      [place] guidelines for Chronic Kidney Disease (CKD):     Stage 1 with normal or high GFR (GFR > 90 mL/min/1 73 square meters)    Stage 2 Mild CKD (GFR = 60-89 mL/min/1 73 square meters)    Stage 3A Moderate CKD (GFR = 45-59 mL/min/1 73 square meters)    Stage 3B Moderate CKD (GFR = 30-44 mL/min/1 73 square meters)    Stage 4 Severe CKD (GFR = 15-29 mL/min/1 73 square meters)    Stage 5 End Stage CKD (GFR <15 mL/min/1 73 square meters)  Note: GFR calculation is accurate only with a steady state creatinine    CBC and differential [[id number]] Collected: 07/12/22 0343    Lab Status: Final result Specimen: Blood from Arm, Right Updated: 07/12/22 0357     WBC 9 92 Thousand/uL      RBC 4 98 Million/uL      Hemoglobin 14 9 g/dL      Hematocrit 46 1 %      MCV 93 fL      MCH 29 9 pg      MCHC 32 3 g/dL      RDW 12 9 %      MPV 9 2 fL      Platelets 681 Thousands/uL      nRBC 0 /100 WBCs      Neutrophils Relative 46 %      Immat GRANS % 0 %      Lymphocytes Relative 44 %      Monocytes Relative 9 %      Eosinophils Relative 1 %      Basophils Relative 0 %      Neutrophils Absolute 4 45 Thousands/µL      Immature Grans Absolute 0 03 Thousand/uL      Lymphocytes Absolute 4 38 Thousands/µL      Monocytes Absolute 0 91 Thousand/µL      Eosinophils Absolute 0 12 Thousand/µL      Basophils Absolute 0 03 Thousands/µL                  X-ray chest 1 view portable   ED Interpretation by Nayeli Root MD (07/12 0411)   Poor inspiratory effort limits interpretation, no acute cardiopulmonary disease            Procedures  Procedures      ED Course  ED Course as of 07/12/22 0627   Tue Jul 12, 2022   0345 ECG 12 lead  Procedure Note: EKG  Date/Time: 07/12/22 5:32 AM   Interpreted by: Ross Castleman, MD  Indications / Diagnosis: CP  ECG reviewed by me, the ED Provider: yes   The EKG demonstrates:  Rhythm: normal sinus  Intervals: normal intervals  Axis: normal axis  QRS/Blocks: normal QRS  ST Changes: No acute ST Changes, no STD/RODOLFO  HEART Risk Score    Flowsheet Row Most Recent Value   Heart Score Risk Calculator    History 0 Filed at: 07/12/2022 0459   ECG 0 Filed at: 07/12/2022 0459   Age 0 Filed at: 07/12/2022 0459   Risk Factors 1 Filed at: 07/12/2022 0459   Troponin 0 Filed at: 07/12/2022 0459   HEART Score 1 Filed at: 07/12/2022 0459                                MDM  Number of Diagnoses or Management Options  Chest pain  Diagnosis management comments: 37 y/o M presenting with chest pain  VSS, EKG w/o ischemic changes  CXR w/o acute cardiopulmonary disease  HEART score of 1  Delta troponin negative  D/w patient negative workup, unclear cause for chest pain but emergent causes ruled out  Recommended PCP f/u  All questions answered to best of ability, pt discharged with return precautions  Disposition  Final diagnoses:   Chest pain     Time reflects when diagnosis was documented in both MDM as applicable and the Disposition within this note     Time User Action Codes Description Comment    7/12/2022  6:18 AM Chidi Lindsay Add [R07 9] Chest pain       ED Disposition     ED Disposition   Discharge    Condition   Stable    Date/Time   Tue Jul 12, 2022  6:18 AM    Comment   Eric Evans discharge to home/self care                 Follow-up Information     Follow up With Specialties Details Why Yusra Thomas MD Internal Medicine   [facility] [id number]  [phone]            Patient's Medications   Discharge Prescriptions    No medications on file     No discharge procedures on file  PDMP Review     None           ED Provider  Attending physically available and evaluated Ty Garcia I managed the patient along with the ED Attending      Electronically Signed by         Jaye Serrano MD  07/12/22 0143

## 2022-08-04 ENCOUNTER — OFFICE VISIT (OUTPATIENT)
Dept: FAMILY MEDICINE CLINIC | Facility: CLINIC | Age: 42
End: 2022-08-04
Payer: COMMERCIAL

## 2022-08-04 VITALS
RESPIRATION RATE: 15 BRPM | OXYGEN SATURATION: 95 % | WEIGHT: 220.8 LBS | HEART RATE: 63 BPM | DIASTOLIC BLOOD PRESSURE: 88 MMHG | SYSTOLIC BLOOD PRESSURE: 130 MMHG | TEMPERATURE: 97.5 F | BODY MASS INDEX: 32.7 KG/M2 | HEIGHT: 69 IN

## 2022-08-04 DIAGNOSIS — R07.89 OTHER CHEST PAIN: Primary | ICD-10-CM

## 2022-08-04 DIAGNOSIS — E53.8 LOW VITAMIN B12 LEVEL: ICD-10-CM

## 2022-08-04 DIAGNOSIS — R79.89 LOW VITAMIN D LEVEL: ICD-10-CM

## 2022-08-04 PROCEDURE — 99214 OFFICE O/P EST MOD 30 MIN: CPT | Performed by: INTERNAL MEDICINE

## 2022-08-04 RX ORDER — ERGOCALCIFEROL 1.25 MG/1
50000 CAPSULE ORAL WEEKLY
Qty: 13 CAPSULE | Refills: 3 | Status: SHIPPED | OUTPATIENT
Start: 2022-08-04 | End: 2022-10-12 | Stop reason: SDUPTHER

## 2022-08-04 RX ORDER — LANOLIN ALCOHOL/MO/W.PET/CERES
1000 CREAM (GRAM) TOPICAL DAILY
Qty: 90 TABLET | Refills: 3 | Status: SHIPPED | OUTPATIENT
Start: 2022-08-04 | End: 2022-10-12 | Stop reason: SDUPTHER

## 2022-08-04 NOTE — PROGRESS NOTES
Assessment/Plan:         Diagnoses and all orders for this visit:    Other chest pain; Atypical, still has to R/O CAD  RTC in 1-2 mos w :  -     Stress test only, exercise; Future  -     Echo complete w/ contrast if indicated; Future  -     UA (URINE) with reflex to Scope; Future  -     Comprehensive metabolic panel; Future  -     CBC and differential; Future  -     Magnesium; Future  -     Lipid panel; Future  -     TSH, 3rd generation; Future  -     T4, free; Future  -     Vitamin B12; Future  -     Vitamin D 25 hydroxy; Future    Low vitamin D level  -     ergocalciferol (VITAMIN D2) 50,000 units; Take 1 capsule (50,000 Units total) by mouth once a week    Low vitamin B12 level  -     vitamin B-12 (VITAMIN B-12) 1,000 mcg tablet; Take 1 tablet (1,000 mcg total) by mouth daily        Subjective:      Patient ID: Adiel Healy is a 38 y o  male  38 Y O Man is here for Regular check up, He was seen in ER for Atypical chest pain, No more symptoms, Med list reviewed w Pt,  The following portions of the patient's history were reviewed and updated as appropriate: allergies, past family history, past medical history, past social history, past surgical history and problem list     Review of Systems   Constitutional: Negative for chills, fatigue and fever  HENT: Negative for congestion, facial swelling, sore throat, trouble swallowing and voice change  Eyes: Negative for pain, discharge and visual disturbance  Respiratory: Negative for cough, shortness of breath and wheezing  Cardiovascular: Positive for chest pain  Negative for palpitations and leg swelling  Gastrointestinal: Negative for abdominal pain, blood in stool, constipation, diarrhea and nausea  Endocrine: Negative for polydipsia, polyphagia and polyuria  Genitourinary: Negative for difficulty urinating, hematuria and urgency  Musculoskeletal: Negative for arthralgias and myalgias  Skin: Negative for rash     Neurological: Negative for dizziness, tremors, weakness and headaches  Hematological: Negative for adenopathy  Does not bruise/bleed easily  Psychiatric/Behavioral: Negative for dysphoric mood, sleep disturbance and suicidal ideas  Objective:      /88 (BP Location: Left arm, Patient Position: Sitting, Cuff Size: Standard)   Pulse 63   Temp 97 5 °F (36 4 °C) (Tympanic)   Resp 15   Ht 5' 9" (1 753 m)   Wt 100 kg (220 lb 12 8 oz)   SpO2 95%   BMI 32 61 kg/m²          Physical Exam  Constitutional:       General: He is not in acute distress  HENT:      Head: Normocephalic  Mouth/Throat:      Pharynx: No oropharyngeal exudate  Eyes:      General: No scleral icterus  Conjunctiva/sclera: Conjunctivae normal       Pupils: Pupils are equal, round, and reactive to light  Neck:      Thyroid: No thyromegaly  Cardiovascular:      Rate and Rhythm: Normal rate and regular rhythm  Heart sounds: Normal heart sounds  No murmur heard  Pulmonary:      Effort: Pulmonary effort is normal  No respiratory distress  Breath sounds: Normal breath sounds  No wheezing or rales  Abdominal:      General: Bowel sounds are normal  There is no distension  Palpations: Abdomen is soft  Tenderness: There is no abdominal tenderness  There is no guarding or rebound  Musculoskeletal:         General: No tenderness  Cervical back: Neck supple  Lymphadenopathy:      Cervical: No cervical adenopathy  Skin:     Coloration: Skin is not pale  Findings: No rash  Neurological:      Mental Status: He is alert and oriented to person, place, and time  Sensory: No sensory deficit  Motor: No weakness

## 2022-09-02 ENCOUNTER — APPOINTMENT (OUTPATIENT)
Dept: LAB | Facility: MEDICAL CENTER | Age: 42
End: 2022-09-02
Payer: COMMERCIAL

## 2022-09-02 DIAGNOSIS — R07.89 OTHER CHEST PAIN: ICD-10-CM

## 2022-09-02 LAB
25(OH)D3 SERPL-MCNC: 88.3 NG/ML (ref 30–100)
ALBUMIN SERPL BCP-MCNC: 3.9 G/DL (ref 3.5–5)
ALP SERPL-CCNC: 60 U/L (ref 46–116)
ALT SERPL W P-5'-P-CCNC: 28 U/L (ref 12–78)
ANION GAP SERPL CALCULATED.3IONS-SCNC: 3 MMOL/L (ref 4–13)
AST SERPL W P-5'-P-CCNC: 19 U/L (ref 5–45)
BASOPHILS # BLD AUTO: 0.02 THOUSANDS/ΜL (ref 0–0.1)
BASOPHILS NFR BLD AUTO: 0 % (ref 0–1)
BILIRUB SERPL-MCNC: 0.69 MG/DL (ref 0.2–1)
BUN SERPL-MCNC: 20 MG/DL (ref 5–25)
CALCIUM SERPL-MCNC: 9.3 MG/DL (ref 8.3–10.1)
CHLORIDE SERPL-SCNC: 110 MMOL/L (ref 96–108)
CHOLEST SERPL-MCNC: 195 MG/DL
CO2 SERPL-SCNC: 29 MMOL/L (ref 21–32)
CREAT SERPL-MCNC: 1.11 MG/DL (ref 0.6–1.3)
EOSINOPHIL # BLD AUTO: 0.13 THOUSAND/ΜL (ref 0–0.61)
EOSINOPHIL NFR BLD AUTO: 2 % (ref 0–6)
ERYTHROCYTE [DISTWIDTH] IN BLOOD BY AUTOMATED COUNT: 12.9 % (ref 11.6–15.1)
GFR SERPL CREATININE-BSD FRML MDRD: 80 ML/MIN/1.73SQ M
GLUCOSE P FAST SERPL-MCNC: 100 MG/DL (ref 65–99)
HCT VFR BLD AUTO: 46.7 % (ref 36.5–49.3)
HDLC SERPL-MCNC: 54 MG/DL
HGB BLD-MCNC: 15.2 G/DL (ref 12–17)
IMM GRANULOCYTES # BLD AUTO: 0.02 THOUSAND/UL (ref 0–0.2)
IMM GRANULOCYTES NFR BLD AUTO: 0 % (ref 0–2)
LDLC SERPL CALC-MCNC: 131 MG/DL (ref 0–100)
LYMPHOCYTES # BLD AUTO: 3.78 THOUSANDS/ΜL (ref 0.6–4.47)
LYMPHOCYTES NFR BLD AUTO: 56 % (ref 14–44)
MAGNESIUM SERPL-MCNC: 2.4 MG/DL (ref 1.6–2.6)
MCH RBC QN AUTO: 30.3 PG (ref 26.8–34.3)
MCHC RBC AUTO-ENTMCNC: 32.5 G/DL (ref 31.4–37.4)
MCV RBC AUTO: 93 FL (ref 82–98)
MONOCYTES # BLD AUTO: 0.58 THOUSAND/ΜL (ref 0.17–1.22)
MONOCYTES NFR BLD AUTO: 9 % (ref 4–12)
NEUTROPHILS # BLD AUTO: 2.18 THOUSANDS/ΜL (ref 1.85–7.62)
NEUTS SEG NFR BLD AUTO: 33 % (ref 43–75)
NONHDLC SERPL-MCNC: 141 MG/DL
NRBC BLD AUTO-RTO: 0 /100 WBCS
PLATELET # BLD AUTO: 290 THOUSANDS/UL (ref 149–390)
PMV BLD AUTO: 9.4 FL (ref 8.9–12.7)
POTASSIUM SERPL-SCNC: 4.4 MMOL/L (ref 3.5–5.3)
PROT SERPL-MCNC: 7.8 G/DL (ref 6.4–8.4)
RBC # BLD AUTO: 5.02 MILLION/UL (ref 3.88–5.62)
SODIUM SERPL-SCNC: 142 MMOL/L (ref 135–147)
T4 FREE SERPL-MCNC: 1.05 NG/DL (ref 0.76–1.46)
TRIGL SERPL-MCNC: 50 MG/DL
TSH SERPL DL<=0.05 MIU/L-ACNC: 1.12 UIU/ML (ref 0.45–4.5)
VIT B12 SERPL-MCNC: 682 PG/ML (ref 100–900)
WBC # BLD AUTO: 6.71 THOUSAND/UL (ref 4.31–10.16)

## 2022-09-02 PROCEDURE — 80061 LIPID PANEL: CPT

## 2022-09-02 PROCEDURE — 36415 COLL VENOUS BLD VENIPUNCTURE: CPT

## 2022-09-02 PROCEDURE — 82607 VITAMIN B-12: CPT

## 2022-09-02 PROCEDURE — 84443 ASSAY THYROID STIM HORMONE: CPT

## 2022-09-02 PROCEDURE — 80053 COMPREHEN METABOLIC PANEL: CPT

## 2022-09-02 PROCEDURE — 85025 COMPLETE CBC W/AUTO DIFF WBC: CPT

## 2022-09-02 PROCEDURE — 83735 ASSAY OF MAGNESIUM: CPT

## 2022-09-02 PROCEDURE — 82306 VITAMIN D 25 HYDROXY: CPT

## 2022-09-02 PROCEDURE — 84439 ASSAY OF FREE THYROXINE: CPT

## 2022-09-23 ENCOUNTER — HOSPITAL ENCOUNTER (OUTPATIENT)
Dept: NON INVASIVE DIAGNOSTICS | Facility: CLINIC | Age: 42
Discharge: HOME | End: 2022-09-23
Payer: COMMERCIAL

## 2022-09-23 VITALS
HEART RATE: 65 BPM | BODY MASS INDEX: 32.58 KG/M2 | SYSTOLIC BLOOD PRESSURE: 130 MMHG | HEIGHT: 69 IN | DIASTOLIC BLOOD PRESSURE: 88 MMHG | WEIGHT: 220 LBS

## 2022-09-23 DIAGNOSIS — R07.89 OTHER CHEST PAIN: ICD-10-CM

## 2022-09-23 LAB
AORTIC ROOT: 3.1 CM
APICAL FOUR CHAMBER EJECTION FRACTION: 66 %
ASCENDING AORTA: 3 CM
BASELINE ST DEPRESSION: 0 MM
CHEST PAIN STATEMENT: NORMAL
E WAVE DECELERATION TIME: 165 MS
FRACTIONAL SHORTENING: 31 % (ref 28–44)
GLOBAL LONGITUIDAL STRAIN: -17 %
INTERVENTRICULAR SEPTUM IN DIASTOLE (PARASTERNAL SHORT AXIS VIEW): 1.1 CM
INTERVENTRICULAR SEPTUM: 1.1 CM (ref 0.6–1.1)
LAAS-AP2: 18.2 CM2
LAAS-AP4: 21 CM2
LEFT ATRIUM AREA SYSTOLE SINGLE PLANE A4C: 20.4 CM2
LEFT ATRIUM SIZE: 3.8 CM
LEFT INTERNAL DIMENSION IN SYSTOLE: 3.4 CM (ref 2.1–4)
LEFT VENTRICLE DIASTOLIC VOLUME (MOD BIPLANE): 143 ML
LEFT VENTRICLE SYSTOLIC VOLUME (MOD BIPLANE): 54 ML
LEFT VENTRICULAR INTERNAL DIMENSION IN DIASTOLE: 4.9 CM (ref 3.5–6)
LEFT VENTRICULAR POSTERIOR WALL IN END DIASTOLE: 1 CM
LEFT VENTRICULAR STROKE VOLUME: 67 ML
LV EF: 62 %
LVSV (TEICH): 67 ML
MAX DIASTOLIC BP: 82 MMHG
MAX HEART RATE: 184 BPM
MAX HR PERCENT: 104 %
MAX HR: 184 BPM
MAX PREDICTED HEART RATE: 176 BPM
MAX. SYSTOLIC BP: 170 MMHG
MV E'TISSUE VEL-SEP: 9 CM/S
MV PEAK A VEL: 0.62 M/S
MV PEAK E VEL: 57 CM/S
MV STENOSIS PRESSURE HALF TIME: 48 MS
MV VALVE AREA P 1/2 METHOD: 4.58 CM2
PROTOCOL NAME: NORMAL
RATE PRESSURE PRODUCT: NORMAL
REASON FOR TERMINATION: NORMAL
RIGHT ATRIUM AREA SYSTOLE A4C: 20.8 CM2
RIGHT VENTRICLE ID DIMENSION: 4.4 CM
SL CV LEFT ATRIUM LENGTH A2C: 4.7 CM
SL CV LV EF: 60
SL CV PED ECHO LEFT VENTRICLE DIASTOLIC VOLUME (MOD BIPLANE) 2D: 115 ML
SL CV PED ECHO LEFT VENTRICLE SYSTOLIC VOLUME (MOD BIPLANE) 2D: 48 ML
SL CV STRESS RECOVERY BP: NORMAL MMHG
SL CV STRESS RECOVERY HR: 112 BPM
SL CV STRESS RECOVERY O2 SAT: 98 %
SL CV STRESS STAGE REACHED: 5
STRESS ANGINA INDEX: 0
STRESS BASELINE BP: NORMAL MMHG
STRESS BASELINE HR: 66 BPM
STRESS DUKE TREADMILL SCORE: 15
STRESS O2 SAT REST: 99 %
STRESS PEAK HR: 184 BPM
STRESS POST ESTIMATED WORKLOAD: 17.2 METS
STRESS POST EXERCISE DUR MIN: 15 MIN
STRESS POST O2 SAT PEAK: 98 %
STRESS POST PEAK BP: 170 MMHG
STRESS ST DEPRESSION: 0 MM
TARGET HR FORMULA: NORMAL
TEST INDICATION: NORMAL
TIME IN EXERCISE PHASE: NORMAL

## 2022-09-23 PROCEDURE — 93018 CV STRESS TEST I&R ONLY: CPT | Performed by: INTERNAL MEDICINE

## 2022-09-23 PROCEDURE — 93306 TTE W/DOPPLER COMPLETE: CPT

## 2022-09-23 PROCEDURE — 93017 CV STRESS TEST TRACING ONLY: CPT

## 2022-09-23 PROCEDURE — 93306 TTE W/DOPPLER COMPLETE: CPT | Performed by: INTERNAL MEDICINE

## 2022-09-23 PROCEDURE — 93016 CV STRESS TEST SUPVJ ONLY: CPT | Performed by: INTERNAL MEDICINE

## 2022-10-12 ENCOUNTER — OFFICE VISIT (OUTPATIENT)
Dept: FAMILY MEDICINE CLINIC | Facility: CLINIC | Age: 42
End: 2022-10-12
Payer: COMMERCIAL

## 2022-10-12 VITALS
WEIGHT: 223 LBS | SYSTOLIC BLOOD PRESSURE: 134 MMHG | TEMPERATURE: 97.8 F | HEIGHT: 69 IN | HEART RATE: 74 BPM | BODY MASS INDEX: 33.03 KG/M2 | RESPIRATION RATE: 14 BRPM | DIASTOLIC BLOOD PRESSURE: 82 MMHG | OXYGEN SATURATION: 97 %

## 2022-10-12 DIAGNOSIS — R79.89 LOW VITAMIN D LEVEL: ICD-10-CM

## 2022-10-12 DIAGNOSIS — E78.49 OTHER HYPERLIPIDEMIA: ICD-10-CM

## 2022-10-12 DIAGNOSIS — E53.8 LOW VITAMIN B12 LEVEL: ICD-10-CM

## 2022-10-12 DIAGNOSIS — Z12.11 SCREEN FOR COLON CANCER: Primary | ICD-10-CM

## 2022-10-12 DIAGNOSIS — R73.09 ELEVATED HEMOGLOBIN A1C: ICD-10-CM

## 2022-10-12 LAB — SL AMB POCT HEMOGLOBIN AIC: 5.5 (ref ?–6.5)

## 2022-10-12 PROCEDURE — 99214 OFFICE O/P EST MOD 30 MIN: CPT | Performed by: INTERNAL MEDICINE

## 2022-10-12 PROCEDURE — 83036 HEMOGLOBIN GLYCOSYLATED A1C: CPT | Performed by: INTERNAL MEDICINE

## 2022-10-12 RX ORDER — LANOLIN ALCOHOL/MO/W.PET/CERES
1000 CREAM (GRAM) TOPICAL DAILY
Qty: 90 TABLET | Refills: 3 | Status: SHIPPED | OUTPATIENT
Start: 2022-10-12

## 2022-10-12 RX ORDER — ERGOCALCIFEROL 1.25 MG/1
50000 CAPSULE ORAL WEEKLY
Qty: 13 CAPSULE | Refills: 3 | Status: SHIPPED | OUTPATIENT
Start: 2022-10-12

## 2022-10-12 NOTE — PROGRESS NOTES
Name: Leah Pederson      : 1980      MRN: [id number]  Encounter Provider: Kulwinder Nolan MD  Encounter Date: 10/12/2022   Encounter department: [facility] [address] [place] [address]     1  Screen for colon cancer  -     Occult Blood, Fecal Immunochemical; Future    2  Low vitamin D level  -     ergocalciferol (VITAMIN D2) 50,000 units; Take 1 capsule (50,000 Units total) by mouth once a week    3  Low vitamin B12 level  -     vitamin B-12 (VITAMIN B-12) 1,000 mcg tablet; Take 1 tablet (1,000 mcg total) by mouth daily    4  Other hyperlipidemia  -     UA (URINE) with reflex to Scope; Future; Expected date: 10/19/2022  -     Lipid panel; Future  -     TSH, 3rd generation; Future; Expected date: 10/12/2022    5  Elevated hemoglobin A1c  -     POCT hemoglobin A1c  -     TSH, 3rd generation; Future; Expected date: 10/12/2022    life style mod  RTC in 2-3 mos w Blood work       Subjective      38 Y O Man is here for regular check up, he feels ok, recent Blood work,and med list reviewed w Pt in detail, He likes to lose weight,  Review of Systems   Constitutional: Negative for chills, fatigue and fever  HENT: Negative for congestion, facial swelling, sore throat, trouble swallowing and voice change  Eyes: Negative for pain, discharge and visual disturbance  Respiratory: Negative for cough, shortness of breath and wheezing  Cardiovascular: Negative for chest pain, palpitations and leg swelling  Gastrointestinal: Negative for abdominal pain, blood in stool, constipation, diarrhea and nausea  Endocrine: Negative for polydipsia, polyphagia and polyuria  Genitourinary: Negative for difficulty urinating, hematuria and urgency  Musculoskeletal: Negative for arthralgias and myalgias  Skin: Negative for rash  Neurological: Negative for dizziness, tremors, weakness and headaches  Hematological: Negative for adenopathy  Does not bruise/bleed easily  Psychiatric/Behavioral: Negative for dysphoric mood, sleep disturbance and suicidal ideas  Current Outpatient Medications on File Prior to Visit   Medication Sig   • [DISCONTINUED] ergocalciferol (VITAMIN D2) 50,000 units Take 1 capsule (50,000 Units total) by mouth once a week   • [DISCONTINUED] vitamin B-12 (VITAMIN B-12) 1,000 mcg tablet Take 1 tablet (1,000 mcg total) by mouth daily       Objective     /82 (BP Location: Left arm, Patient Position: Sitting, Cuff Size: Standard)   Pulse 74   Temp 97 8 °F (36 6 °C) (Tympanic)   Resp 14   Ht 5' 9" (1 753 m)   Wt 101 kg (223 lb)   SpO2 97%   BMI 32 93 kg/m²     Physical Exam  Constitutional:       General: He is not in acute distress  HENT:      Head: Normocephalic  Mouth/Throat:      Pharynx: No oropharyngeal exudate  Eyes:      General: No scleral icterus  Conjunctiva/sclera: Conjunctivae normal       Pupils: Pupils are equal, round, and reactive to light  Neck:      Thyroid: No thyromegaly  Cardiovascular:      Rate and Rhythm: Normal rate and regular rhythm  Heart sounds: Normal heart sounds  No murmur heard  Pulmonary:      Effort: Pulmonary effort is normal  No respiratory distress  Breath sounds: Normal breath sounds  No wheezing or rales  Abdominal:      General: Bowel sounds are normal  There is no distension  Palpations: Abdomen is soft  Tenderness: There is no abdominal tenderness  There is no guarding or rebound  Musculoskeletal:         General: No tenderness  Cervical back: Neck supple  Lymphadenopathy:      Cervical: No cervical adenopathy  Skin:     Coloration: Skin is not pale  Findings: No rash  Neurological:      Mental Status: He is alert and oriented to person, place, and time  Sensory: No sensory deficit  Motor: No weakness         Azra Jackson MD

## 2023-01-19 ENCOUNTER — OFFICE VISIT (OUTPATIENT)
Dept: FAMILY MEDICINE CLINIC | Facility: CLINIC | Age: 43
End: 2023-01-19

## 2023-01-19 VITALS
DIASTOLIC BLOOD PRESSURE: 74 MMHG | BODY MASS INDEX: 32.29 KG/M2 | SYSTOLIC BLOOD PRESSURE: 132 MMHG | WEIGHT: 218 LBS | OXYGEN SATURATION: 98 % | HEIGHT: 69 IN | RESPIRATION RATE: 14 BRPM | TEMPERATURE: 97.3 F | HEART RATE: 84 BPM

## 2023-01-19 DIAGNOSIS — R79.89 LOW VITAMIN D LEVEL: ICD-10-CM

## 2023-01-19 DIAGNOSIS — Z12.5 SCREENING FOR PROSTATE CANCER: ICD-10-CM

## 2023-01-19 DIAGNOSIS — R73.09 ELEVATED HEMOGLOBIN A1C: ICD-10-CM

## 2023-01-19 DIAGNOSIS — E53.8 LOW VITAMIN B12 LEVEL: ICD-10-CM

## 2023-01-19 DIAGNOSIS — Z00.01 ENCOUNTER FOR GENERAL ADULT MEDICAL EXAMINATION WITH ABNORMAL FINDINGS: Primary | ICD-10-CM

## 2023-01-19 LAB — SL AMB POCT HEMOGLOBIN AIC: 5.4 (ref ?–6.5)

## 2023-01-19 RX ORDER — LANOLIN ALCOHOL/MO/W.PET/CERES
1000 CREAM (GRAM) TOPICAL DAILY
Qty: 90 TABLET | Refills: 3 | Status: SHIPPED | OUTPATIENT
Start: 2023-01-19

## 2023-01-19 RX ORDER — ERGOCALCIFEROL 1.25 MG/1
50000 CAPSULE ORAL WEEKLY
Qty: 13 CAPSULE | Refills: 3 | Status: SHIPPED | OUTPATIENT
Start: 2023-01-19 | End: 2023-01-19 | Stop reason: SDUPTHER

## 2023-01-19 RX ORDER — ERGOCALCIFEROL 1.25 MG/1
50000 CAPSULE ORAL WEEKLY
Qty: 13 CAPSULE | Refills: 3 | Status: SHIPPED | OUTPATIENT
Start: 2023-01-19

## 2023-01-19 RX ORDER — LANOLIN ALCOHOL/MO/W.PET/CERES
1000 CREAM (GRAM) TOPICAL DAILY
Qty: 90 TABLET | Refills: 3 | Status: SHIPPED | OUTPATIENT
Start: 2023-01-19 | End: 2023-01-19 | Stop reason: SDUPTHER

## 2023-01-19 NOTE — PROGRESS NOTES
Name: Carla Matute      : 1980      MRN: [id number]  Encounter Provider: Valdez Mitchell MD  Encounter Date: 2023   Encounter department: [facility] [address] [place] [address]     1  Encounter for general adult medical examination with abnormal findings  -     UA (URINE) with reflex to Scope; Future; Expected date: 2023  -     Comprehensive metabolic panel; Future  -     CBC and differential; Future  -     Magnesium; Future  -     Lipid panel; Future  -     PSA Total, Diagnostic; Future  -     TSH, 3rd generation; Future; Expected date: 2023  -     T4, free; Future; Expected date: 2023    2  Low vitamin B12 level  -     vitamin B-12 (VITAMIN B-12) 1,000 mcg tablet; Take 1 tablet (1,000 mcg total) by mouth daily    3  Low vitamin D level  -     ergocalciferol (VITAMIN D2) 50,000 units; Take 1 capsule (50,000 Units total) by mouth once a week    4  Elevated hemoglobin A1c  -     POCT hemoglobin A1c  -     Semaglutide-Weight Management (WEGOVY) 0 25 MG/0 5ML; Inject 0 5 mL (0 25 mg total) under the skin once a week for 28 days    5  Screening for prostate cancer  -     PSA Total, Diagnostic; Future    6  BMI 32 0-32 9,adult  -     Semaglutide-Weight Management (WEGOVY) 0 25 MG/0 5ML; Inject 0 5 mL (0 25 mg total) under the skin once a week for 28 days    life style mod  Increase wegovy slowly gradually as Pt tolerated  RTC in 1-2 mos w Blood  work       Subjective      38 Y O man is here for Annual physical Exam and Regular check up, he feels Ok, No recent Blood work, med list reviewed w  Pt in detail  , he quit smoking two years ago,  Review of Systems   Constitutional: Negative for chills, fatigue and fever  HENT: Negative for congestion, facial swelling, sore throat, trouble swallowing and voice change  Eyes: Negative for pain, discharge and visual disturbance  Respiratory: Negative for cough, shortness of breath and wheezing  Cardiovascular: Negative for chest pain, palpitations and leg swelling  Gastrointestinal: Negative for abdominal pain, blood in stool, constipation, diarrhea and nausea  Endocrine: Negative for polydipsia, polyphagia and polyuria  Genitourinary: Negative for difficulty urinating, hematuria and urgency  Musculoskeletal: Negative for arthralgias and myalgias  Skin: Negative for rash  Neurological: Negative for dizziness, tremors, weakness and headaches  Hematological: Negative for adenopathy  Does not bruise/bleed easily  Psychiatric/Behavioral: Negative for dysphoric mood, sleep disturbance and suicidal ideas  Current Outpatient Medications on File Prior to Visit   Medication Sig   • [DISCONTINUED] ergocalciferol (VITAMIN D2) 50,000 units Take 1 capsule (50,000 Units total) by mouth once a week   • [DISCONTINUED] vitamin B-12 (VITAMIN B-12) 1,000 mcg tablet Take 1 tablet (1,000 mcg total) by mouth daily       Objective     /74 (BP Location: Left arm, Patient Position: Sitting, Cuff Size: Standard)   Pulse 84   Temp (!) 97 3 °F (36 3 °C) (Tympanic)   Resp 14   Ht 5' 9" (1 753 m)   Wt 98 9 kg (218 lb)   SpO2 98%   BMI 32 19 kg/m²     Physical Exam  Constitutional:       General: He is not in acute distress  HENT:      Head: Normocephalic  Mouth/Throat:      Pharynx: No oropharyngeal exudate  Eyes:      General: No scleral icterus  Conjunctiva/sclera: Conjunctivae normal       Pupils: Pupils are equal, round, and reactive to light  Neck:      Thyroid: No thyromegaly  Cardiovascular:      Rate and Rhythm: Normal rate and regular rhythm  Heart sounds: Normal heart sounds  No murmur heard  Pulmonary:      Effort: Pulmonary effort is normal  No respiratory distress  Breath sounds: Normal breath sounds  No wheezing or rales  Abdominal:      General: Bowel sounds are normal  There is no distension  Palpations: Abdomen is soft  Tenderness:  There is no abdominal tenderness  There is no guarding or rebound  Musculoskeletal:         General: No tenderness  Cervical back: Neck supple  Lymphadenopathy:      Cervical: No cervical adenopathy  Skin:     Coloration: Skin is not pale  Findings: No rash  Neurological:      Mental Status: He is alert and oriented to person, place, and time  Sensory: No sensory deficit  Motor: No weakness         Stefania Herrera MD

## 2023-02-27 ENCOUNTER — APPOINTMENT (OUTPATIENT)
Dept: LAB | Facility: MEDICAL CENTER | Age: 43
End: 2023-02-27

## 2023-02-27 DIAGNOSIS — Z12.5 SCREENING FOR PROSTATE CANCER: ICD-10-CM

## 2023-02-27 DIAGNOSIS — Z00.01 ENCOUNTER FOR GENERAL ADULT MEDICAL EXAMINATION WITH ABNORMAL FINDINGS: ICD-10-CM

## 2023-02-27 LAB
ALBUMIN SERPL BCP-MCNC: 3.8 G/DL (ref 3.5–5)
ALP SERPL-CCNC: 56 U/L (ref 46–116)
ALT SERPL W P-5'-P-CCNC: 23 U/L (ref 12–78)
ANION GAP SERPL CALCULATED.3IONS-SCNC: 3 MMOL/L (ref 4–13)
AST SERPL W P-5'-P-CCNC: 16 U/L (ref 5–45)
BASOPHILS # BLD AUTO: 0.03 THOUSANDS/ÂΜL (ref 0–0.1)
BASOPHILS NFR BLD AUTO: 1 % (ref 0–1)
BILIRUB SERPL-MCNC: 0.42 MG/DL (ref 0.2–1)
BUN SERPL-MCNC: 20 MG/DL (ref 5–25)
CALCIUM SERPL-MCNC: 9.4 MG/DL (ref 8.3–10.1)
CHLORIDE SERPL-SCNC: 110 MMOL/L (ref 96–108)
CHOLEST SERPL-MCNC: 173 MG/DL
CO2 SERPL-SCNC: 28 MMOL/L (ref 21–32)
CREAT SERPL-MCNC: 1 MG/DL (ref 0.6–1.3)
EOSINOPHIL # BLD AUTO: 0.16 THOUSAND/ÂΜL (ref 0–0.61)
EOSINOPHIL NFR BLD AUTO: 3 % (ref 0–6)
ERYTHROCYTE [DISTWIDTH] IN BLOOD BY AUTOMATED COUNT: 13.1 % (ref 11.6–15.1)
GFR SERPL CREATININE-BSD FRML MDRD: 91 ML/MIN/1.73SQ M
GLUCOSE P FAST SERPL-MCNC: 98 MG/DL (ref 65–99)
HCT VFR BLD AUTO: 43.4 % (ref 36.5–49.3)
HDLC SERPL-MCNC: 48 MG/DL
HGB BLD-MCNC: 14.1 G/DL (ref 12–17)
IMM GRANULOCYTES # BLD AUTO: 0.02 THOUSAND/UL (ref 0–0.2)
IMM GRANULOCYTES NFR BLD AUTO: 0 % (ref 0–2)
LDLC SERPL CALC-MCNC: 115 MG/DL (ref 0–100)
LYMPHOCYTES # BLD AUTO: 3.31 THOUSANDS/ÂΜL (ref 0.6–4.47)
LYMPHOCYTES NFR BLD AUTO: 51 % (ref 14–44)
MAGNESIUM SERPL-MCNC: 2.2 MG/DL (ref 1.6–2.6)
MCH RBC QN AUTO: 30.3 PG (ref 26.8–34.3)
MCHC RBC AUTO-ENTMCNC: 32.5 G/DL (ref 31.4–37.4)
MCV RBC AUTO: 93 FL (ref 82–98)
MONOCYTES # BLD AUTO: 0.62 THOUSAND/ÂΜL (ref 0.17–1.22)
MONOCYTES NFR BLD AUTO: 10 % (ref 4–12)
NEUTROPHILS # BLD AUTO: 2.23 THOUSANDS/ÂΜL (ref 1.85–7.62)
NEUTS SEG NFR BLD AUTO: 35 % (ref 43–75)
NONHDLC SERPL-MCNC: 125 MG/DL
NRBC BLD AUTO-RTO: 0 /100 WBCS
PLATELET # BLD AUTO: 259 THOUSANDS/UL (ref 149–390)
PMV BLD AUTO: 9.1 FL (ref 8.9–12.7)
POTASSIUM SERPL-SCNC: 4.5 MMOL/L (ref 3.5–5.3)
PROT SERPL-MCNC: 6.7 G/DL (ref 6.4–8.4)
PSA SERPL-MCNC: 0.3 NG/ML (ref 0–4)
RBC # BLD AUTO: 4.65 MILLION/UL (ref 3.88–5.62)
SODIUM SERPL-SCNC: 141 MMOL/L (ref 135–147)
T4 FREE SERPL-MCNC: 0.94 NG/DL (ref 0.76–1.46)
TRIGL SERPL-MCNC: 49 MG/DL
TSH SERPL DL<=0.05 MIU/L-ACNC: 0.87 UIU/ML (ref 0.45–4.5)
WBC # BLD AUTO: 6.37 THOUSAND/UL (ref 4.31–10.16)

## 2023-03-02 ENCOUNTER — OFFICE VISIT (OUTPATIENT)
Dept: FAMILY MEDICINE CLINIC | Facility: CLINIC | Age: 43
End: 2023-03-02

## 2023-03-02 VITALS
TEMPERATURE: 96.4 F | RESPIRATION RATE: 16 BRPM | SYSTOLIC BLOOD PRESSURE: 120 MMHG | OXYGEN SATURATION: 98 % | HEIGHT: 69 IN | BODY MASS INDEX: 31.84 KG/M2 | DIASTOLIC BLOOD PRESSURE: 70 MMHG | WEIGHT: 215 LBS | HEART RATE: 78 BPM

## 2023-03-02 DIAGNOSIS — E53.8 LOW VITAMIN B12 LEVEL: ICD-10-CM

## 2023-03-02 DIAGNOSIS — L98.9 SKIN LESIONS: ICD-10-CM

## 2023-03-02 DIAGNOSIS — R79.89 LOW VITAMIN D LEVEL: ICD-10-CM

## 2023-03-02 RX ORDER — LANOLIN ALCOHOL/MO/W.PET/CERES
1000 CREAM (GRAM) TOPICAL DAILY
Qty: 90 TABLET | Refills: 3 | Status: SHIPPED | OUTPATIENT
Start: 2023-03-02

## 2023-03-02 RX ORDER — ERGOCALCIFEROL 1.25 MG/1
50000 CAPSULE ORAL WEEKLY
Qty: 13 CAPSULE | Refills: 3 | Status: SHIPPED | OUTPATIENT
Start: 2023-03-02

## 2023-03-02 RX ORDER — NEOMYCIN SULFATE, POLYMYXIN B SULFATE AND BACITRACIN ZINC 3.5; 10000; 4 MG/G; [USP'U]/G; [USP'U]/G
0.5 OINTMENT OPHTHALMIC 3 TIMES DAILY
Qty: 3.5 G | Refills: 0 | Status: SHIPPED | OUTPATIENT
Start: 2023-03-02

## 2023-03-02 RX ORDER — SEMAGLUTIDE 0.25 MG/.5ML
INJECTION, SOLUTION SUBCUTANEOUS
COMMUNITY
Start: 2023-02-20 | End: 2023-03-02

## 2023-03-02 NOTE — PROGRESS NOTES
Name: Cordelia Yip      : 1980      MRN: [id number]  Encounter Provider: Sonia Pacheco MD  Encounter Date: 3/2/2023   Encounter department: [facility]– [place]note 2[place] [address] [place] [address]     1  BMI 31 0-31 9,adult    2  Low vitamin B12 level  -     vitamin B-12 (VITAMIN B-12) 1,000 mcg tablet; Take 1 tablet (1,000 mcg total) by mouth daily    3  Low vitamin D level  -     ergocalciferol (VITAMIN D2) 50,000 units; Take 1 capsule (50,000 Units total) by mouth once a week    4  Skin lesions  -     Ambulatory Referral to Dermatology; Future  -     neomycin-bacitracin-polymyxin (NEOSPORIN) ophthalmic ointment; Administer 0 5 inches into the left eye 3 (three) times a day    continue and renew Meds  RTC in 3 mos w blood  work       Subjective      38 Y O Man is here for Regular Check Up, he feels OK, recent Blood work and med list reviewed w Pt in detail    Review of Systems   Constitutional: Negative for chills, fatigue and fever  HENT: Negative for congestion, facial swelling, sore throat, trouble swallowing and voice change  Eyes: Negative for pain, discharge and visual disturbance  Respiratory: Negative for cough, shortness of breath and wheezing  Cardiovascular: Negative for chest pain, palpitations and leg swelling  Gastrointestinal: Negative for abdominal pain, blood in stool, constipation, diarrhea and nausea  Endocrine: Negative for polydipsia, polyphagia and polyuria  Genitourinary: Negative for difficulty urinating, hematuria and urgency  Musculoskeletal: Negative for arthralgias and myalgias  Skin: Negative for rash  Neurological: Negative for dizziness, tremors, weakness and headaches  Hematological: Negative for adenopathy  Does not bruise/bleed easily  Psychiatric/Behavioral: Negative for dysphoric mood, sleep disturbance and suicidal ideas         Current Outpatient Medications on File Prior to Visit   Medication Sig   • [DISCONTINUED] ergocalciferol (VITAMIN D2) 50,000 units Take 1 capsule (50,000 Units total) by mouth once a week   • [DISCONTINUED] vitamin B-12 (VITAMIN B-12) 1,000 mcg tablet Take 1 tablet (1,000 mcg total) by mouth daily   • [DISCONTINUED] Wegovy 0 25 MG/0 5ML  (Patient not taking: Reported on 3/2/2023)       Objective     /70 (BP Location: Left arm, Patient Position: Sitting, Cuff Size: Large)   Pulse 78   Temp (!) 96 4 °F (35 8 °C) (Tympanic)   Resp 16   Ht 5' 9" (1 753 m)   Wt 97 5 kg (215 lb)   SpO2 98%   BMI 31 75 kg/m²     Physical Exam  Constitutional:       General: He is not in acute distress  HENT:      Head: Normocephalic  Mouth/Throat:      Pharynx: No oropharyngeal exudate  Eyes:      General: No scleral icterus  Conjunctiva/sclera: Conjunctivae normal       Pupils: Pupils are equal, round, and reactive to light  Neck:      Thyroid: No thyromegaly  Cardiovascular:      Rate and Rhythm: Normal rate and regular rhythm  Heart sounds: Normal heart sounds  No murmur heard  Pulmonary:      Effort: Pulmonary effort is normal  No respiratory distress  Breath sounds: Normal breath sounds  No wheezing or rales  Abdominal:      General: Bowel sounds are normal  There is no distension  Palpations: Abdomen is soft  Tenderness: There is no abdominal tenderness  There is no guarding or rebound  Musculoskeletal:         General: No tenderness  Cervical back: Neck supple  Lymphadenopathy:      Cervical: No cervical adenopathy  Skin:     Coloration: Skin is not pale  Findings: No rash  Neurological:      Mental Status: He is alert and oriented to person, place, and time  Sensory: No sensory deficit  Motor: No weakness         Mei Weber MD